# Patient Record
Sex: MALE | Race: WHITE
[De-identification: names, ages, dates, MRNs, and addresses within clinical notes are randomized per-mention and may not be internally consistent; named-entity substitution may affect disease eponyms.]

---

## 2017-11-08 ENCOUNTER — HOSPITAL ENCOUNTER (EMERGENCY)
Dept: HOSPITAL 47 - EC | Age: 27
Discharge: HOME | End: 2017-11-08
Payer: COMMERCIAL

## 2017-11-08 VITALS
SYSTOLIC BLOOD PRESSURE: 114 MMHG | RESPIRATION RATE: 18 BRPM | HEART RATE: 80 BPM | TEMPERATURE: 98.5 F | DIASTOLIC BLOOD PRESSURE: 75 MMHG

## 2017-11-08 DIAGNOSIS — H10.9: Primary | ICD-10-CM

## 2017-11-08 DIAGNOSIS — F17.200: ICD-10-CM

## 2017-11-08 DIAGNOSIS — Z88.8: ICD-10-CM

## 2017-11-08 PROCEDURE — 99283 EMERGENCY DEPT VISIT LOW MDM: CPT

## 2017-11-08 NOTE — ED
Eye Problem HPI





- General


Chief complaint: Eye Problems


Stated complaint: Poss Pink Eye


Time Seen by Provider: 11/08/17 12:30


Source: patient


Mode of arrival: ambulatory


Limitations: no limitations





- History of Present Illness


Initial comments: 


27-year-old male patient presented to the emergency department today for 

complaints of bilateral eye redness and drainage.  Patient states that last 

evening at the end of his work shift his eyes started becoming irritated and 

itchy.  He states that when he woke this morning there was a lot of green 

discharge in his eyes were stuck shut.  Patient states that the irritation has 

continued throughout the day today.  Patient states that his son was recently 

treated for pinkeye.  Patient states he has also had some minor nasal 

congestion and drainage.  He denies any fever or chills.  Denies any blurred or 

double vision. Patient denies any recent shortness breath, cough, chest pain, 

abdominal pain, nausea, vomiting, diarrhea, constipation, back pain, numbness, 

tingling, headache, visual changes, hematuria, dysuria, urinary frequency, 

urinary urgency, or any other complaints.








- Related Data


 Previous Rx's











 Medication  Instructions  Recorded


 


Polymyxin B-Trimethoprim Ophth 1 drops BOTH EYES Q4H #15 ml 11/08/17





[Polytrim Opthalmic]  











 Allergies











Allergy/AdvReac Type Severity Reaction Status Date / Time


 


clonazepam [From Klonopin] Allergy  Anaphylaxis Verified 11/08/17 12:28














Review of Systems


ROS Statement: 


Those systems with pertinent positive or pertinent negative responses have been 

documented in the HPI.





ROS Other: All systems not noted in ROS Statement are negative.





Past Medical History


Additional Past Medical History / Comment(s): heart murmur,


History of Any Multi-Drug Resistant Organisms: None Reported


Past Surgical History: No Surgical Hx Reported


Past Psychological History: Bipolar, Depression


Smoking Status: Current every day smoker


Past Alcohol Use History: Occasional


Past Drug Use History: None Reported





General Exam


Limitations: no limitations


General appearance: alert, in no apparent distress, other (This is a well-

developed, well-nourished adult male patient in no acute distress.  Vital signs 

upon presentation her temperature 98.5F, pulse 80, respirations 18, blood 

pressure 114/75, pulse ox 98% on room air.)


Eye exam: Present: PERRL, EOMI, conjunctival injection (Bilateral, mild), other 

(Presence of green crusting the bilateral inner canthus,  Mucus present on 

eyelashes.).  Absent: scleral icterus, nystagmus, periorbital swelling


ENT exam: Present: normal exam, normal oropharynx, mucous membranes moist, TM's 

normal bilaterally


Respiratory exam: Present: normal lung sounds bilaterally.  Absent: respiratory 

distress, wheezes, rales, rhonchi, stridor


Cardiovascular Exam: Present: regular rate, normal rhythm, normal heart sounds.

  Absent: systolic murmur, diastolic murmur, rubs, gallop, clicks


Neurological exam: Present: alert, oriented X3, CN II-XII intact


Psychiatric exam: Present: normal affect, normal mood


Skin exam: Present: warm, dry, intact, normal color.  Absent: rash





Course


 Vital Signs











  11/08/17





  12:27


 


Temperature 98.5 F


 


Pulse Rate 80


 


Respiratory 18





Rate 


 


Blood Pressure 114/75


 


O2 Sat by Pulse 98





Oximetry 














Medical Decision Making





- Medical Decision Making


Physical 7-year-old male patient who presented today for evaluation of 

bilateral eye redness and drainage.  Physical examination did reveal bilateral 

conjunctival injection, with green purulent discharge.  Patient's son was 

recently treated for pinkeye.  Errol patient on trimethroprim polymyxin B drops 

1 drop per eye 4 times daily.  Patient denied any visual disturbance.  Denied 

any eye pain.  He will be sent to ophthalmology if his symptoms do not improve 

over the next 1-2 days.  He is instructed to return here immediately for any new

, worsening, or concerning symptoms.  He verbalizes understanding and agreement 

with this plan.








Disposition


Clinical Impression: 


 Conjunctivitis





Disposition: HOME SELF-CARE


Condition: Good


Instructions:  Conjunctivitis (ED)


Additional Instructions: 


Use drops as directed. Follow up with ophthalmology in 2 days if symptoms are 

not improved. Return here immediately for any new, worsening, or concerning 

symptoms. 


Prescriptions: 


Polymyxin B-Trimethoprim Ophth [Polytrim Opthalmic] 1 drops BOTH EYES Q4H #15 ml


Referrals: 


None,Stated [Primary Care Provider] - 1-2 days


Time of Disposition: 12:38

## 2018-03-01 ENCOUNTER — HOSPITAL ENCOUNTER (EMERGENCY)
Dept: HOSPITAL 47 - EC | Age: 28
Discharge: HOME | End: 2018-03-01
Payer: COMMERCIAL

## 2018-03-01 VITALS
HEART RATE: 84 BPM | TEMPERATURE: 98.4 F | SYSTOLIC BLOOD PRESSURE: 104 MMHG | DIASTOLIC BLOOD PRESSURE: 55 MMHG | RESPIRATION RATE: 20 BRPM

## 2018-03-01 DIAGNOSIS — J02.9: ICD-10-CM

## 2018-03-01 DIAGNOSIS — M54.5: ICD-10-CM

## 2018-03-01 DIAGNOSIS — F17.200: ICD-10-CM

## 2018-03-01 DIAGNOSIS — Z88.8: ICD-10-CM

## 2018-03-01 DIAGNOSIS — R78.89: ICD-10-CM

## 2018-03-01 DIAGNOSIS — R00.0: ICD-10-CM

## 2018-03-01 DIAGNOSIS — D72.829: ICD-10-CM

## 2018-03-01 DIAGNOSIS — J40: Primary | ICD-10-CM

## 2018-03-01 LAB
ANION GAP SERPL CALC-SCNC: 12 MMOL/L
BASOPHILS # BLD AUTO: 0.1 K/UL (ref 0–0.2)
BASOPHILS NFR BLD AUTO: 0 %
BUN SERPL-SCNC: 9 MG/DL (ref 9–20)
CALCIUM SPEC-MCNC: 9.1 MG/DL (ref 8.4–10.2)
CHLORIDE SERPL-SCNC: 99 MMOL/L (ref 98–107)
CO2 SERPL-SCNC: 26 MMOL/L (ref 22–30)
EOSINOPHIL # BLD AUTO: 0.2 K/UL (ref 0–0.7)
EOSINOPHIL NFR BLD AUTO: 1 %
ERYTHROCYTE [DISTWIDTH] IN BLOOD BY AUTOMATED COUNT: 4.93 M/UL (ref 4.3–5.9)
ERYTHROCYTE [DISTWIDTH] IN BLOOD: 12.4 % (ref 11.5–15.5)
GLUCOSE SERPL-MCNC: 96 MG/DL (ref 74–99)
HCT VFR BLD AUTO: 42.4 % (ref 39–53)
HGB BLD-MCNC: 14.5 GM/DL (ref 13–17.5)
LYMPHOCYTES # SPEC AUTO: 1.1 K/UL (ref 1–4.8)
LYMPHOCYTES NFR SPEC AUTO: 7 %
MCH RBC QN AUTO: 29.5 PG (ref 25–35)
MCHC RBC AUTO-ENTMCNC: 34.3 G/DL (ref 31–37)
MCV RBC AUTO: 86 FL (ref 80–100)
MONOCYTES # BLD AUTO: 0.8 K/UL (ref 0–1)
MONOCYTES NFR BLD AUTO: 5 %
NEUTROPHILS # BLD AUTO: 14 K/UL (ref 1.3–7.7)
NEUTROPHILS NFR BLD AUTO: 86 %
PH UR: 6 [PH] (ref 5–8)
PLATELET # BLD AUTO: 144 K/UL (ref 150–450)
POTASSIUM SERPL-SCNC: 4.4 MMOL/L (ref 3.5–5.1)
SODIUM SERPL-SCNC: 137 MMOL/L (ref 137–145)
SP GR UR: 1.02 (ref 1–1.03)
UROBILINOGEN UR QL STRIP: 2 MG/DL (ref ?–2)
WBC # BLD AUTO: 16.3 K/UL (ref 3.8–10.6)

## 2018-03-01 PROCEDURE — 85025 COMPLETE CBC W/AUTO DIFF WBC: CPT

## 2018-03-01 PROCEDURE — 81003 URINALYSIS AUTO W/O SCOPE: CPT

## 2018-03-01 PROCEDURE — 71046 X-RAY EXAM CHEST 2 VIEWS: CPT

## 2018-03-01 PROCEDURE — 96360 HYDRATION IV INFUSION INIT: CPT

## 2018-03-01 PROCEDURE — 96361 HYDRATE IV INFUSION ADD-ON: CPT

## 2018-03-01 PROCEDURE — 72100 X-RAY EXAM L-S SPINE 2/3 VWS: CPT

## 2018-03-01 PROCEDURE — 80048 BASIC METABOLIC PNL TOTAL CA: CPT

## 2018-03-01 PROCEDURE — 36415 COLL VENOUS BLD VENIPUNCTURE: CPT

## 2018-03-01 PROCEDURE — 99283 EMERGENCY DEPT VISIT LOW MDM: CPT

## 2018-03-01 NOTE — XR
EXAMINATION TYPE: XR chest 2V

 

DATE OF EXAM: 3/1/2018

 

COMPARISON: Prior chest x-ray August 14, 2015.

 

HISTORY: Productive cough with sore throat and sinus congestion.

 

TECHNIQUE:  Frontal and lateral views of the chest are obtained.

 

FINDINGS:  There is no focal air space opacity, pleural effusion, or pneumothorax seen.  The cardiac 
silhouette size is within normal limits.   The osseous structures are intact.

 

IMPRESSION:  No acute pulmonary process. No significant change from prior.

## 2018-03-01 NOTE — XR
EXAMINATION TYPE: XR lumbar spine 2 or 3V

 

DATE OF EXAM: 3/1/2018

 

CLINICAL HISTORY: Low back pain.

 

TECHNIQUE: Frontal and lateral images of the lumbar spine are obtained.

 

COMPARISON: None

 

FINDINGS:  There are 5 lumbar type vertebral bodies identified.  The lumbar spine shows straightened 
alignment without evidence of acute fracture or dislocation. Spina bifida defect S1 level is incident
ally noted. Vertebral body heights and disk space heights are within normal limits. No significant sp
urring is present.  The overlying soft tissue appears unremarkable.

 

IMPRESSION: Straightening of lumbar spine with spina bifida defect S1 level.

## 2018-04-11 ENCOUNTER — HOSPITAL ENCOUNTER (EMERGENCY)
Dept: HOSPITAL 47 - EC | Age: 28
Discharge: HOME | End: 2018-04-11
Payer: SELF-PAY

## 2018-04-11 VITALS — TEMPERATURE: 98 F | SYSTOLIC BLOOD PRESSURE: 116 MMHG | DIASTOLIC BLOOD PRESSURE: 58 MMHG | HEART RATE: 74 BPM

## 2018-04-11 VITALS — RESPIRATION RATE: 18 BRPM

## 2018-04-11 DIAGNOSIS — F17.200: ICD-10-CM

## 2018-04-11 DIAGNOSIS — K08.89: ICD-10-CM

## 2018-04-11 DIAGNOSIS — R06.02: ICD-10-CM

## 2018-04-11 DIAGNOSIS — Z88.8: ICD-10-CM

## 2018-04-11 DIAGNOSIS — R07.81: ICD-10-CM

## 2018-04-11 DIAGNOSIS — G89.18: Primary | ICD-10-CM

## 2018-04-11 LAB
ALBUMIN SERPL-MCNC: 4.1 G/DL (ref 3.5–5)
ALP SERPL-CCNC: 53 U/L (ref 38–126)
ALT SERPL-CCNC: 23 U/L (ref 21–72)
ANION GAP SERPL CALC-SCNC: 12 MMOL/L
APTT BLD: 25.9 SEC (ref 22–30)
AST SERPL-CCNC: 17 U/L (ref 17–59)
BASOPHILS # BLD AUTO: 0 K/UL (ref 0–0.2)
BASOPHILS NFR BLD AUTO: 1 %
BUN SERPL-SCNC: 7 MG/DL (ref 9–20)
CALCIUM SPEC-MCNC: 9.4 MG/DL (ref 8.4–10.2)
CHLORIDE SERPL-SCNC: 103 MMOL/L (ref 98–107)
CO2 SERPL-SCNC: 28 MMOL/L (ref 22–30)
EOSINOPHIL # BLD AUTO: 0.1 K/UL (ref 0–0.7)
EOSINOPHIL NFR BLD AUTO: 2 %
ERYTHROCYTE [DISTWIDTH] IN BLOOD BY AUTOMATED COUNT: 5 M/UL (ref 4.3–5.9)
ERYTHROCYTE [DISTWIDTH] IN BLOOD: 12.7 % (ref 11.5–15.5)
GLUCOSE SERPL-MCNC: 59 MG/DL (ref 74–99)
HCT VFR BLD AUTO: 43.6 % (ref 39–53)
HGB BLD-MCNC: 15.4 GM/DL (ref 13–17.5)
INR PPP: 1.1 (ref ?–1.2)
LYMPHOCYTES # SPEC AUTO: 1.6 K/UL (ref 1–4.8)
LYMPHOCYTES NFR SPEC AUTO: 29 %
MCH RBC QN AUTO: 30.8 PG (ref 25–35)
MCHC RBC AUTO-ENTMCNC: 35.3 G/DL (ref 31–37)
MCV RBC AUTO: 87.2 FL (ref 80–100)
MONOCYTES # BLD AUTO: 0.4 K/UL (ref 0–1)
MONOCYTES NFR BLD AUTO: 8 %
NEUTROPHILS # BLD AUTO: 3.4 K/UL (ref 1.3–7.7)
NEUTROPHILS NFR BLD AUTO: 59 %
PLATELET # BLD AUTO: 177 K/UL (ref 150–450)
POTASSIUM SERPL-SCNC: 4.2 MMOL/L (ref 3.5–5.1)
PROT SERPL-MCNC: 6.7 G/DL (ref 6.3–8.2)
PT BLD: 10.3 SEC (ref 9–12)
SODIUM SERPL-SCNC: 143 MMOL/L (ref 137–145)
WBC # BLD AUTO: 5.6 K/UL (ref 3.8–10.6)

## 2018-04-11 PROCEDURE — 85025 COMPLETE CBC W/AUTO DIFF WBC: CPT

## 2018-04-11 PROCEDURE — 99285 EMERGENCY DEPT VISIT HI MDM: CPT

## 2018-04-11 PROCEDURE — 93005 ELECTROCARDIOGRAM TRACING: CPT

## 2018-04-11 PROCEDURE — 84484 ASSAY OF TROPONIN QUANT: CPT

## 2018-04-11 PROCEDURE — 36415 COLL VENOUS BLD VENIPUNCTURE: CPT

## 2018-04-11 PROCEDURE — 85730 THROMBOPLASTIN TIME PARTIAL: CPT

## 2018-04-11 PROCEDURE — 71046 X-RAY EXAM CHEST 2 VIEWS: CPT

## 2018-04-11 PROCEDURE — 85610 PROTHROMBIN TIME: CPT

## 2018-04-11 PROCEDURE — 80053 COMPREHEN METABOLIC PANEL: CPT

## 2018-04-11 NOTE — XR
EXAMINATION TYPE: XR chest 2V

 

DATE OF EXAM: 4/11/2018

 

COMPARISON: 3/1/2018

 

HISTORY: Difficulty breathing

 

TECHNIQUE:  Frontal and lateral views of the chest are obtained.

 

FINDINGS:  Heart and mediastinum are normal. Lungs are clear. Diaphragm is normal. There is no sign o
f pleural effusion or pneumothorax. Bony thorax is intact.

 

IMPRESSION:  Normal chest. No change.

## 2018-04-11 NOTE — ED
General Adult HPI





- General


Chief complaint: Shortness of Breath


Stated complaint: Dental


Time Seen by Provider: 04/11/18 17:22


Source: patient, RN notes reviewed


Mode of arrival: ambulatory


Limitations: no limitations





- History of Present Illness


Initial comments: 


This is a 27-year-old male who presents to the emergency department with chief 

complaint of dental pain and shortness of breath.  Patient states that he had 4 

left lower teeth pulled yesterday at the dentist.  He states he was prescribed 

amoxicillin but has not yet picked up his prescription.  He complains of pain 

where teeth were pulled.  He states he was not given any pain medication.  

Patient also complains of shortness of breath.  He states that approximately 10 

times today he feels like he has to "try to breathe."  He states that he has to 

take deep breaths to try to catch his breath and feels pain in his rib cage 

while doing so.  Patient denies any cough or upper respiratory symptoms such as 

sore throat or rhinorrhea.  He does state that he has had previous episodes of 

this shortness of breath in the past.  He states he believes it is related to 

stress and admits to having an increase in stress in his life recently.  He 

denies any fevers or chills.  Denies abdominal pain, nausea or vomiting, 

diarrhea or constipation.  Denies chest pain.  Denies any recent 

hospitalizations or surgeries, recent surgeries, history of blood clots.








- Related Data


 Home Medications











 Medication  Instructions  Recorded  Confirmed


 


Ibuprofen [Motrin] 800 mg PO BID PRN 03/01/18 04/11/18








 Previous Rx's











 Medication  Instructions  Recorded


 


Ibuprofen 600 mg PO Q6HR #20 tablet 04/11/18











 Allergies











Allergy/AdvReac Type Severity Reaction Status Date / Time


 


clonazepam [From Klonopin] Allergy  Anaphylaxis Verified 04/11/18 17:34














Review of Systems


ROS Statement: 


Those systems with pertinent positive or pertinent negative responses have been 

documented in the HPI.





ROS Other: All systems not noted in ROS Statement are negative.





Past Medical History


Additional Past Medical History / Comment(s): heart murmur,


History of Any Multi-Drug Resistant Organisms: None Reported


Past Surgical History: No Surgical Hx Reported


Past Psychological History: Bipolar, Depression


Smoking Status: Current every day smoker


Past Alcohol Use History: Occasional


Past Drug Use History: None Reported





General Exam





- General Exam Comments


Initial Comments: 





General: Awake and alert, well-developed; in no apparent distress.


HEENT: Head atraumatic, normocephalic. Pupils are equal, round and reactive to 

light. Extraocular movements intact. Oropharynx moist without erythema or 

exudate.  Very poor dentition throughout with multiple missing teeth and dental 

caries.  Teeth numbers 17 through 20 are recently extracted.  No masses or 

areas of fluctuance noted.  Mild tenderness on palpation of gumline.


Neck: Supple. Normal ROM. 


Cardiovascular: Regular rate and rhythm. No murmurs, rubs or gallops. Chest 

symmetrical.  


Respiratory: Lungs clear to auscultation bilaterally. No wheezes, rales or 

rhonchi. Normal respiratory effort with no use of accessory muscles. 


Musculoskeletal: Normal ROM, no tenderness bilateral upper and lower 

extremities. Ambulating normally. 


Skin: Pink, warm and dry without rashes or lesions. 


Neurological: Alert and oriented x3. CN II-XII grossly intact. Speech is fluent 

and answers are appropriate. No focal neuro deficits. 


Psychiatric: Normal mood and affect. No overt signs of depression or anxiety 

noted. 











Limitations: no limitations





Course


 Vital Signs











  04/11/18





  16:43


 


Temperature 97.8 F


 


Pulse Rate 101 H


 


Respiratory 18





Rate 


 


Blood Pressure 119/80


 


O2 Sat by Pulse 98





Oximetry 














Medical Decision Making





- Medical Decision Making


This is a 27-year-old male presented to the emergency department with chief 

complaint of dental pain and shortness of breath.  Patient had 4 left lower 

molars removed yesterday by his dentist.  He states he was not given any pain 

medication.  Patient will be prescribed ibuprofen 600.  Patient also complained 

of episodes of shortness of breath through the day today.  He states this may 

be related to stress as he has had these episodes in the past during times of 

increased stress in his life.  EKG revealed normal sinus rhythm.  Chest x-ray 

revealed no acute abnormalities.  CBC, CMP, and coags were unremarkable.  

Troponin was negative.  Patient's vital signs have been stable and he is in no 

acute distress. PERC is 0. EKG revealed heart rate of 65 bpm, no recent surgery 

with general anesthesia, no recent hospitalizations or travel, no history of 

blood clots. Low suspicion for PE.  He will be discharged home at this time.  

Patient is in agreement with plan and voices understanding.  All questions were 

answered.








- Lab Data


Result diagrams: 


 04/11/18 17:41





 04/11/18 17:41


 Lab Results











  04/11/18 04/11/18 Range/Units





  17:41 17:41 


 


WBC  5.6   (3.8-10.6)  k/uL


 


RBC  5.00   (4.30-5.90)  m/uL


 


Hgb  15.4   (13.0-17.5)  gm/dL


 


Hct  43.6   (39.0-53.0)  %


 


MCV  87.2   (80.0-100.0)  fL


 


MCH  30.8   (25.0-35.0)  pg


 


MCHC  35.3   (31.0-37.0)  g/dL


 


RDW  12.7   (11.5-15.5)  %


 


Plt Count  177   (150-450)  k/uL


 


Neutrophils %  59   %


 


Lymphocytes %  29   %


 


Monocytes %  8   %


 


Eosinophils %  2   %


 


Basophils %  1   %


 


Neutrophils #  3.4   (1.3-7.7)  k/uL


 


Lymphocytes #  1.6   (1.0-4.8)  k/uL


 


Monocytes #  0.4   (0-1.0)  k/uL


 


Eosinophils #  0.1   (0-0.7)  k/uL


 


Basophils #  0.0   (0-0.2)  k/uL


 


Sodium   143  (137-145)  mmol/L


 


Potassium   4.2  (3.5-5.1)  mmol/L


 


Chloride   103  ()  mmol/L


 


Carbon Dioxide   28  (22-30)  mmol/L


 


Anion Gap   12  mmol/L


 


BUN   7 L  (9-20)  mg/dL


 


Creatinine   0.69  (0.66-1.25)  mg/dL


 


Est GFR (CKD-EPI)AfAm   >90  (>60 ml/min/1.73 sqM)  


 


Est GFR (CKD-EPI)NonAf   >90  (>60 ml/min/1.73 sqM)  


 


Glucose   59 L  (74-99)  mg/dL


 


Calcium   9.4  (8.4-10.2)  mg/dL


 


Total Bilirubin   0.5  (0.2-1.3)  mg/dL


 


AST   17  (17-59)  U/L


 


ALT   23  (21-72)  U/L


 


Alkaline Phosphatase   53  ()  U/L


 


Total Protein   6.7  (6.3-8.2)  g/dL


 


Albumin   4.1  (3.5-5.0)  g/dL














17:48:26.  Normal sinus rhythm.  Ventricular rate 65 bpm, VT interval 124, QRS 

duration 96, QT//84








- Radiology Data


Radiology results: report reviewed


Chest x-ray impression: Normal chest.  No change.





Disposition


Clinical Impression: 


 Pain, dental





Disposition: HOME SELF-CARE


Condition: Good


Instructions:  Toothache (ED)


Additional Instructions: 


Please take medications as prescribed. Please follow up with primary care 

provider within 1-2 days. Return to emergency department if symptoms should 

worsen or any concerns arise. 


Prescriptions: 


Ibuprofen 600 mg PO Q6HR #20 tablet


Referrals: 


None,Stated [Primary Care Provider] - 1-2 days


Time of Disposition: 19:07

## 2018-07-11 ENCOUNTER — HOSPITAL ENCOUNTER (EMERGENCY)
Dept: HOSPITAL 47 - EC | Age: 28
Discharge: HOME | End: 2018-07-11
Payer: COMMERCIAL

## 2018-07-11 VITALS
DIASTOLIC BLOOD PRESSURE: 71 MMHG | SYSTOLIC BLOOD PRESSURE: 131 MMHG | HEART RATE: 72 BPM | RESPIRATION RATE: 18 BRPM | TEMPERATURE: 98.7 F

## 2018-07-11 DIAGNOSIS — R94.02: ICD-10-CM

## 2018-07-11 DIAGNOSIS — S00.81XA: Primary | ICD-10-CM

## 2018-07-11 DIAGNOSIS — M54.2: ICD-10-CM

## 2018-07-11 DIAGNOSIS — F17.200: ICD-10-CM

## 2018-07-11 DIAGNOSIS — R40.2412: ICD-10-CM
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
## 2018-07-11 DIAGNOSIS — Z23: ICD-10-CM

## 2018-07-11 DIAGNOSIS — Z88.8: ICD-10-CM

## 2018-07-11 PROCEDURE — 72125 CT NECK SPINE W/O DYE: CPT

## 2018-07-11 PROCEDURE — 99283 EMERGENCY DEPT VISIT LOW MDM: CPT

## 2018-07-11 PROCEDURE — 90471 IMMUNIZATION ADMIN: CPT

## 2018-07-11 PROCEDURE — 70450 CT HEAD/BRAIN W/O DYE: CPT

## 2018-07-11 PROCEDURE — 90715 TDAP VACCINE 7 YRS/> IM: CPT

## 2018-07-11 NOTE — ED
General Adult HPI





- General


Chief complaint: Head Injury


Stated complaint: head injury


Time Seen by Provider: 07/11/18 19:48


Source: patient, RN notes reviewed


Mode of arrival: ambulatory


Limitations: no limitations





- History of Present Illness


Initial comments: 





28-year-old male presents to the emergency department for a chief complaint of 

head injury.  Patient states he dropped a television on his head yesterday when 

he was trying to lift into the dumpster.  Patient denies loss of consciousness 

at that time but states he has been dizzy since then.  Patient states he has 

had a headache since the incident yesterday.  Patient denies any visual 

changes.  Patient also admits to neck pain.  Patient denies any other 

injuries.Patient has no other complaints at this time including shortness of 

breath, chest pain, abdominal pain, nausea or vomiting, headache, or visual 

changes.





- Related Data


 Home Medications











 Medication  Instructions  Recorded  Confirmed


 


Ibuprofen [Motrin Ib] 1,000 mg PO Q6H PRN 07/11/18 07/11/18








 Previous Rx's











 Medication  Instructions  Recorded


 


Acetaminophen [Tylenol] 500 mg PO Q4-6H PRN #20 tab 07/11/18











 Allergies











Allergy/AdvReac Type Severity Reaction Status Date / Time


 


clonazepam [From Klonopin] Allergy  Anaphylaxis Verified 07/11/18 20:07














Review of Systems


ROS Statement: 


Those systems with pertinent positive or pertinent negative responses have been 

documented in the HPI.





ROS Other: All systems not noted in ROS Statement are negative.





Past Medical History


Additional Past Medical History / Comment(s): heart murmur,


History of Any Multi-Drug Resistant Organisms: None Reported


Past Surgical History: No Surgical Hx Reported


Past Psychological History: Bipolar, Depression


Smoking Status: Current every day smoker


Past Alcohol Use History: Occasional


Past Drug Use History: None Reported





General Exam


Limitations: no limitations


General appearance: alert, in no apparent distress


Head exam: Present: normocephalic.  Absent: atraumatic (Patient has a 2 cm x 2 

cm abrasion on the frontal bone.  No step off palpated.)


Eye exam: Present: normal appearance, PERRL, EOMI.  Absent: scleral icterus, 

conjunctival injection, nystagmus, periorbital swelling, periorbital tenderness


ENT exam: Present: normal exam, normal oropharynx, mucous membranes moist, TM's 

normal bilaterally, normal external ear exam


Neck exam: Present: tenderness (Mild tenderness to the cervical spine), full 

ROM.  Absent: meningismus


Respiratory exam: Present: normal lung sounds bilaterally.  Absent: respiratory 

distress, wheezes, rales, rhonchi, stridor


Cardiovascular Exam: Present: regular rate, normal rhythm, normal heart sounds.

  Absent: systolic murmur, diastolic murmur, rubs, gallop, clicks


Neurological exam: Present: alert, oriented X3, CN II-XII intact, normal gait, 

other (GCS 15, negative arm drift, strength 5 out of 5 in upper and lower 

extremities bilaterally).  Absent: motor sensory deficit





Course


 Vital Signs











  07/11/18





  19:24


 


Temperature 98.7 F


 


Pulse Rate 72


 


Respiratory 18





Rate 


 


Blood Pressure 131/71


 


O2 Sat by Pulse 99





Oximetry 














Medical Decision Making





- Medical Decision Making





28-year-old male presents to the emergency department for a chief complaint of 

head injury.  Patient dropped a television on his head last night.  Patient has 

been experiencing headaches and dizziness since that time.  Patient also admits 

to mild neck pain.  Mild tenderness on exam but full range of motion.  On exam 

no focal neuro deficits.  GCS 15. Computed tomography scan of the head shows no 

mass effect or midline shift.  No sign of intra-cranial hemorrhage.  Intact 

calvarium.  There is a possible new area of small vessel ischemi measuring as a 

4 mm area of hypodensity.  This does not fit the clinical picture.  Cervical 

spine shows no evidence of compression facture or displacement.  Patient will 

follow-up with primary care for this.  He will take Tylenol for pain.  He will 

monitor for worsening symptoms or return if these occur.





Disposition


Clinical Impression: 


 Closed head injury





Disposition: HOME SELF-CARE


Condition: Good


Instructions:  Head Injury (ED)


Additional Instructions: 


Please take Tylenol for pain.  Please monitor for worsening symptoms such as 

severe headache, confusion, vomiting and return if these occur.  Follow-up with 

primary care in 1-2 days.


Prescriptions: 


Acetaminophen [Tylenol] 500 mg PO Q4-6H PRN #20 tab


 PRN Reason: Pain


Is patient prescribed a controlled substance at d/c from ED?: No


Referrals: 


Christiano Matta MD [STAFF PHYSICIAN] - 1-2 days


Time of Disposition: 20:53

## 2018-07-11 NOTE — CT
EXAMINATION TYPE: CT brain luisito taveras con

 

DATE OF EXAM: 7/11/2018

 

COMPARISON: Head CT scan 7/14/2012

 

HISTORY: pain headache. Neck pain.

 

CT DLP: 1022.3 mGycm

Automated exposure control for dose reduction was used.

 

TECHNIQUE: CT scan of the head and cervical spine are performed without contrast.

 

FINDINGS:   Ventricles of normal size. There is no mass effect nor midline shift. There is no sign of
 intracranial hemorrhage. There is intact calvarium. There is 4 mm hypodensity in the left anterior i
nternal capsule.

 

 

 

Cervical vertebra have normal spacing and alignment. Posterior elements are intact. Facet joints appe
ar normal. There is no evidence of a compression fracture. The skull base appears intact.

 

IMPRESSION:

Possible new area of small vessel ischemia in the anterior left internal capsule compared to old exam
.. 

 

Negative CT scan of cervical spine.

## 2018-08-20 ENCOUNTER — HOSPITAL ENCOUNTER (EMERGENCY)
Dept: HOSPITAL 47 - EC | Age: 28
Discharge: HOME | End: 2018-08-20
Payer: COMMERCIAL

## 2018-08-20 VITALS
TEMPERATURE: 98.2 F | DIASTOLIC BLOOD PRESSURE: 67 MMHG | SYSTOLIC BLOOD PRESSURE: 113 MMHG | HEART RATE: 51 BPM | RESPIRATION RATE: 18 BRPM
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
## 2018-08-20 DIAGNOSIS — Z88.8: ICD-10-CM
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
## 2018-08-20 DIAGNOSIS — S53.402A: Primary | ICD-10-CM

## 2018-08-20 DIAGNOSIS — F17.200: ICD-10-CM

## 2018-08-20 PROCEDURE — 99283 EMERGENCY DEPT VISIT LOW MDM: CPT

## 2018-08-20 NOTE — ED
Upper Extremity HPI





- General


Chief Complaint: Extremity Injury, Upper


Stated Complaint: leg pain


Time Seen by Provider: 08/20/18 07:50


Source: patient, RN notes reviewed


Mode of arrival: ambulatory


Limitations: no limitations





- History of Present Illness


Initial Comments: 





28-year-old male presents to the emergency Department with the chief complaint 

of left elbow pain.  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 

with his left arm with a gun and felt a pop in his left elbow.  He states that 

he had to use of the hand to fully extend his hand and arm at that time he 

states that he continued working but every time he goes to do full extension of 

his elbow he has popping, severe pain.  Patient is right-hand dominant.  Denies 

any prior issues with his left elbow, arm.  Patient denies any paresthesias 

denies any direct trauma to his left elbow and arm.





- Related Data


 Home Medications











 Medication  Instructions  Recorded  Confirmed


 


Ibuprofen [Motrin Ib] 1,000 mg PO Q6H PRN 07/11/18 07/11/18








 Previous Rx's











 Medication  Instructions  Recorded


 


Acetaminophen [Tylenol] 500 mg PO Q4-6H PRN #20 tab 07/11/18


 


Ibuprofen [Motrin] 600 mg PO Q8HR PRN #30 tab 08/20/18











 Allergies











Allergy/AdvReac Type Severity Reaction Status Date / Time


 


clonazepam [From Klonopin] Allergy  Anaphylaxis Verified 07/11/18 20:07














Review of Systems


ROS Statement: 


Those systems with pertinent positive or pertinent negative responses have been 

documented in the HPI.





ROS Other: All systems not noted in ROS Statement are negative.





Past Medical History


Additional Past Medical History / Comment(s): heart murmur,


History of Any Multi-Drug Resistant Organisms: None Reported


Past Surgical History: No Surgical Hx Reported


Past Psychological History: Bipolar, Depression


Smoking Status: Current every day smoker


Past Alcohol Use History: Occasional


Past Drug Use History: None Reported





General Exam


Limitations: no limitations


General appearance: alert, in no apparent distress


Respiratory exam: Present: normal lung sounds bilaterally.  Absent: respiratory 

distress, wheezes, rales, rhonchi, stridor


Cardiovascular Exam: Present: regular rate, normal rhythm, normal heart sounds.

  Absent: systolic murmur, diastolic murmur, rubs, gallop, clicks


Extremities exam: Present: other (Left arm neurovascular intact there is no 

obvious deformity no ecchymosis no change in warmth, patient has pain with 

extension of his left elbow, wrist extension there is mild tenderness the 

medial aspect of the elbow there is no tenderness of proximal left arm, minimal 

discomfort with pronation supination)


Neurological exam: Present: reflexes normal.  Absent: motor sensory deficit


Skin exam: Present: warm, dry, intact, normal color.  Absent: rash





Course


 Vital Signs











  08/20/18





  07:27


 


Temperature 98.1 F


 


Pulse Rate 80


 


Respiratory 16





Rate 


 


Blood Pressure 119/79


 


O2 Sat by Pulse 99





Oximetry 














Medical Decision Making





- Medical Decision Making





28-year-old male present to the ED for left elbow injury.  This appears to the 

left elbow strain.  Patient will be started on anti-inflammatories, follow up 

with [facility] advised to apply ice 20 minutes at a time return for any worsening 

symptoms.





Disposition


Clinical Impression: 


 Sprain of left elbow





Disposition: HOME SELF-CARE


Condition: Stable


Instructions:  Elbow Sprain (ED)


Additional Instructions: 


Please return to the Emergency Department if symptoms worsen or any other 

concerns.


Prescriptions: 


Ibuprofen [Motrin] 600 mg PO Q8HR PRN #30 tab


 PRN Reason: Pain


Is patient prescribed a controlled substance at d/c from ED?: No


Referrals: 


None,Stated [Primary Care Provider] - 1-2 days


Time of Disposition: 08:43

## 2018-08-20 NOTE — XR
EXAMINATION TYPE: XR elbow complete LT

 

DATE OF EXAM: 8/20/2018

 

CLINICAL HISTORY: Injury with pain.

 

TECHNIQUE:  Frontal, lateral and oblique images of the left elbow are obtained.

 

COMPARISON: None

 

FINDINGS:  There is no acute fracture/dislocation evident in the left elbow.  No abnormal fat pad sig
ns are seen.  The overlying soft tissue appears unremarkable.

 

IMPRESSION:  There is no acute fracture or dislocation in the left elbow.

## 2018-08-23 ENCOUNTER — HOSPITAL ENCOUNTER (EMERGENCY)
Dept: HOSPITAL 47 - EC | Age: 28
Discharge: HOME | End: 2018-08-23
Payer: COMMERCIAL

## 2018-08-23 VITALS — SYSTOLIC BLOOD PRESSURE: 119 MMHG | TEMPERATURE: 98.3 F | DIASTOLIC BLOOD PRESSURE: 62 MMHG | HEART RATE: 62 BPM

## 2018-08-23 VITALS — RESPIRATION RATE: 16 BRPM

## 2018-08-23 DIAGNOSIS — Z79.899: ICD-10-CM

## 2018-08-23 DIAGNOSIS — Z88.8: ICD-10-CM

## 2018-08-23 DIAGNOSIS — F17.200: ICD-10-CM

## 2018-08-23 DIAGNOSIS — R21: Primary | ICD-10-CM

## 2018-08-23 PROCEDURE — 87205 SMEAR GRAM STAIN: CPT

## 2018-08-23 PROCEDURE — 87529 HSV DNA AMP PROBE: CPT

## 2018-08-23 PROCEDURE — 99283 EMERGENCY DEPT VISIT LOW MDM: CPT

## 2018-08-23 PROCEDURE — 87070 CULTURE OTHR SPECIMN AEROBIC: CPT

## 2018-08-23 NOTE — ED
Male Urogenital HPI





- General


Chief complaint: Urogenital


Stated complaint: male gu


Time Seen by Provider: 08/23/18 19:33


Source: patient


Mode of arrival: ambulatory


Limitations: no limitations





- History of Present Illness


Initial comments: 


28 years old male presents with some blisters on his genitals he is a first 

time he notices blisters that was about a month ago then they got resolved now 

he has BLISTERS again for 3 days he is concerned about sexually transmitted 

diseases.  He has been with the same partner for 3 years and he hasn't noticed 

any lesions on her genital.  He did notice some mom bumps on her hand.  Review 

of system is unremarkable otherwise








- Related Data


 Home Medications











 Medication  Instructions  Recorded  Confirmed


 


Ibuprofen [Motrin Ib] 1,000 mg PO Q6H PRN 07/11/18 08/23/18


 


Ascorbic Acid [Vitamin C] 500 mg PO DAILY 08/23/18 08/23/18








 Previous Rx's











 Medication  Instructions  Recorded


 


Cephalexin [Keflex] 500 mg PO TID #21 capsule 08/23/18











 Allergies











Allergy/AdvReac Type Severity Reaction Status Date / Time


 


clonazepam [From Klonopin] Allergy  Anaphylaxis Verified 08/23/18 19:53














Review of Systems


ROS Statement: 


Those systems with pertinent positive or pertinent negative responses have been 

documented in the HPI.





ROS Other: All systems not noted in ROS Statement are negative.





Past Medical History


Additional Past Medical History / Comment(s): heart murmur,


History of Any Multi-Drug Resistant Organisms: None Reported


Past Surgical History: No Surgical Hx Reported


Past Psychological History: Bipolar, Depression


Smoking Status: Current every day smoker


Past Alcohol Use History: Occasional


Past Drug Use History: None Reported





General Exam





- General Exam Comments


Initial Comments: 


General:  The patient is awake and alert, in no distress, and does not appear 

acutely ill. 


Skin:  Skin is warm and dry and no rashes or lesions are noted. 


Eye:  Pupils are equal, round and reactive to light, extra-ocular movements are 

intact; there is normal conjunctiva bilaterally.  


Ears, nose, mouth and throat:  There are moist mucous membranes and no oral 

lesions. 


Neck:  The neck is supple, there is no tenderness  or JVD.  


Cardiovascular:  There is a regular rate and rhythm. No murmur, rub or gallop 

is appreciated.


Respiratory: To auscultation bilateral, no wheezing no rhonchi no distress  

respiratory wise noticed


Gastrointestinal:  Soft, non-distended, non-tender abdomen without masses or 

organomegaly noted. There is no rebound or guarding present. Bowel sounds are 

unremarkable.  Examination of the gases reveal a group of vesicles on the 

proximal penile shaft, this patient could be a viral, or paresthesia noted be 

impetigo


Back:  There is no tenderness to palpation in the midline. There is no obvious 

deformity.


Musculoskeletal:  Normal ROM, no tenderness, There is no pedal edema. There is 

no calf tenderness or swelling. No cords were appreciated.  


Neurological:  CN II-XII intact, Cranial nerves III through XII are intact. 

There are no obvious motor or sensory deficits. Coordination appears grossly 

intact. Speech is normal.


Psychiatric:  Cooperative, appropriate mood & affect, normal judgment.  








Limitations: no limitations





Course


 Vital Signs











  08/23/18





  18:48


 


Temperature 99.0 F


 


Pulse Rate 106 H


 


Respiratory 16





Rate 


 


Blood Pressure 113/79


 


O2 Sat by Pulse 97





Oximetry 








Viral cultures were done to see if is a herpes and bacterial cultures were done 

as well looks are somewhat open overlapping the field there are 45 vesicles 

grouped Or this is a folliculitis/impetigo both viral and bacterial cultures 

were done patient was advised to use condoms during the tilt he gets the 

culture report back.  I could be just viral infection with a secondary 

bacterial infection I plan to put him on now Keflex 500 mg 3 times a day for 

next 5 days and will add the antiviral if herpes is confirmed





Disposition


Clinical Impression: 


 Rash of genital area





Disposition: HOME SELF-CARE


Condition: Good


Prescriptions: 


Cephalexin [Keflex] 500 mg PO TID #21 capsule


Is patient prescribed a controlled substance at d/c from ED?: No


Referrals: 


None,Stated [Primary Care Provider] - 1-2 days

## 2019-04-19 ENCOUNTER — HOSPITAL ENCOUNTER (EMERGENCY)
Dept: HOSPITAL 47 - EC | Age: 29
Discharge: HOME | End: 2019-04-19
Payer: COMMERCIAL

## 2019-04-19 VITALS
RESPIRATION RATE: 18 BRPM | HEART RATE: 85 BPM | TEMPERATURE: 98.4 F | SYSTOLIC BLOOD PRESSURE: 111 MMHG | DIASTOLIC BLOOD PRESSURE: 73 MMHG

## 2019-04-19 DIAGNOSIS — Z79.899: ICD-10-CM

## 2019-04-19 DIAGNOSIS — K02.9: Primary | ICD-10-CM

## 2019-04-19 DIAGNOSIS — Z88.8: ICD-10-CM

## 2019-04-19 DIAGNOSIS — F17.200: ICD-10-CM

## 2019-04-19 PROCEDURE — 99283 EMERGENCY DEPT VISIT LOW MDM: CPT

## 2019-04-19 NOTE — ED
ENT HPI





- General


Chief complaint: Dental/Oral


Stated complaint: Oral Pain


Time Seen by Provider: 04/19/19 16:37


Source: patient, RN notes reviewed, old records reviewed


Mode of arrival: ambulatory


Limitations: no limitations





- History of Present Illness


Initial comments: 





Patient is a 28-year-old male who presents emergency Department today with 

complaints of dental pain front upper tooth.  Patient states that he has poor 

dentition and multiple dental caries.  Patient states that he was trying to see 

a dental clinic.  Unable to.  He's been having chronic pain from his teeth for 

many years.  Patient denies any fevers or chills.  Denies trismus or call 

drainage from the tooth.





- Related Data


                                Home Medications











 Medication  Instructions  Recorded  Confirmed


 


Ibuprofen [Motrin Ib] 1,000 mg PO Q6H PRN 07/11/18 08/23/18


 


Ascorbic Acid [Vitamin C] 500 mg PO DAILY 08/23/18 08/23/18








                                  Previous Rx's











 Medication  Instructions  Recorded


 


Cephalexin [Keflex] 500 mg PO TID #21 capsule 08/23/18


 


Penicillin V Potassium [Pen Vee K] 500 mg PO QID #40 tablet 04/19/19











                                    Allergies











Allergy/AdvReac Type Severity Reaction Status Date / Time


 


clonazepam [From Klonopin] Allergy  Anaphylaxis Verified 08/23/18 19:53














Review of Systems


ROS Statement: 


Those systems with pertinent positive or pertinent negative responses have been 

documented in the HPI.





ROS Other: All systems not noted in ROS Statement are negative.





Past Medical History


Additional Past Medical History / Comment(s): heart murmur,


History of Any Multi-Drug Resistant Organisms: None Reported


Past Surgical History: No Surgical Hx Reported


Past Psychological History: Bipolar, Depression


Smoking Status: Current every day smoker


Past Alcohol Use History: Occasional


Past Drug Use History: None Reported





General Exam





- General Exam Comments


Initial Comments: 





Patient is a 28-year-old male.  Alert and oriented 3.  Patient appears in no 

significant distress.


Limitations: no limitations


General appearance: alert, in no apparent distress


Head exam: Present: atraumatic, normocephalic, normal inspection


Eye exam: Present: normal appearance, PERRL, EOMI.  Absent: scleral icterus, 

conjunctival injection, periorbital swelling


ENT exam: Present: normal exam, mucous membranes moist.  Absent: normal 

oropharynx (Patient has significant poor dentition.  Multiple dental caries and 

erosion of the teeth noted.)


Neck exam: Present: normal inspection.  Absent: tenderness, meningismus, 

lymphadenopathy


Respiratory exam: Present: normal lung sounds bilaterally.  Absent: respiratory 

distress, wheezes, rales, rhonchi, stridor


Cardiovascular Exam: Present: regular rate, normal rhythm, normal heart sounds. 

Absent: systolic murmur, diastolic murmur, rubs, gallop, clicks


GI/Abdominal exam: Present: soft, normal bowel sounds.  Absent: distended, 

tenderness, guarding, rebound, rigid


Extremities exam: Present: normal inspection, full ROM, normal capillary refill.

 Absent: tenderness, pedal edema, joint swelling, calf tenderness


Back exam: Present: normal inspection


Neurological exam: Present: alert, oriented X3, CN II-XII intact


Psychiatric exam: Present: normal affect, normal mood


Skin exam: Present: warm, dry, intact, normal color.  Absent: rash





Course


                                   Vital Signs











  04/19/19





  16:33


 


Temperature 98.4 F


 


Pulse Rate 85


 


Respiratory 18





Rate 


 


Blood Pressure 111/73


 


O2 Sat by Pulse 98





Oximetry 














Medical Decision Making





- Medical Decision Making





Patient's 28-year-old male presents return today with dental pain.  He states is

somewhat chronic and poor dentition Patient is multiple dental caries noted.  

His erosion of the front of her tooth.  Discussed that he needs to see a dentist

EC fold.  We'll start the Patient on Pen-Vee K at this time.  This is needs to 

take Motrin Tylenol for pain and do saltwater and Listerine rinses.  All 

questions answered return parameters were discussed. 





Disposition


Clinical Impression: 


 Dental caries, Pain, dental





Disposition: HOME SELF-CARE


Condition: Good


Instructions (If sedation given, give patient instructions):  Dental Caries (ED)


Additional Instructions: 


Follow-up with primary care doctor.  Return to emergency department if any 

alarming signs or symptoms occur.








[facility]





[facility] [place] [address]





[phone] (existing clients only)





For new clients: [phone]





1st consult: $50 (includes Xrays)





Usually 30% less then private dentist for visits after. 





[facility] [employer]





Have to pay $50 for Xrays anmd rest is covered.





[phone]


Prescriptions: 


Penicillin V Potassium [Pen Vee K] 500 mg PO QID #40 tablet


Is patient prescribed a controlled substance at d/c from ED?: No


Referrals: 


Jeromy Guthrie MD [Primary Care Provider] - 1-2 days


Time of Disposition: 17:00

## 2019-07-27 ENCOUNTER — HOSPITAL ENCOUNTER (EMERGENCY)
Dept: HOSPITAL 47 - EC | Age: 29
Discharge: HOME | End: 2019-07-27
Payer: COMMERCIAL

## 2019-07-27 VITALS
HEART RATE: 59 BPM | RESPIRATION RATE: 18 BRPM | TEMPERATURE: 97.8 F | DIASTOLIC BLOOD PRESSURE: 77 MMHG | SYSTOLIC BLOOD PRESSURE: 108 MMHG

## 2019-07-27 DIAGNOSIS — N50.812: ICD-10-CM

## 2019-07-27 DIAGNOSIS — K59.00: Primary | ICD-10-CM

## 2019-07-27 DIAGNOSIS — Z88.8: ICD-10-CM

## 2019-07-27 DIAGNOSIS — R10.32: ICD-10-CM

## 2019-07-27 DIAGNOSIS — F17.200: ICD-10-CM

## 2019-07-27 LAB
ALBUMIN SERPL-MCNC: 4.3 G/DL (ref 3.5–5)
ALP SERPL-CCNC: 48 U/L (ref 38–126)
ALT SERPL-CCNC: 13 U/L (ref 21–72)
ANION GAP SERPL CALC-SCNC: 6 MMOL/L
AST SERPL-CCNC: 15 U/L (ref 17–59)
BASOPHILS # BLD AUTO: 0 K/UL (ref 0–0.2)
BASOPHILS NFR BLD AUTO: 1 %
BUN SERPL-SCNC: 8 MG/DL (ref 9–20)
CALCIUM SPEC-MCNC: 9.3 MG/DL (ref 8.4–10.2)
CHLORIDE SERPL-SCNC: 105 MMOL/L (ref 98–107)
CO2 SERPL-SCNC: 30 MMOL/L (ref 22–30)
EOSINOPHIL # BLD AUTO: 0.2 K/UL (ref 0–0.7)
EOSINOPHIL NFR BLD AUTO: 3 %
ERYTHROCYTE [DISTWIDTH] IN BLOOD BY AUTOMATED COUNT: 4.67 M/UL (ref 4.3–5.9)
ERYTHROCYTE [DISTWIDTH] IN BLOOD: 14.6 % (ref 11.5–15.5)
GLUCOSE SERPL-MCNC: 74 MG/DL (ref 74–99)
HCT VFR BLD AUTO: 41.1 % (ref 39–53)
HGB BLD-MCNC: 13.8 GM/DL (ref 13–17.5)
LYMPHOCYTES # SPEC AUTO: 1.7 K/UL (ref 1–4.8)
LYMPHOCYTES NFR SPEC AUTO: 31 %
MCH RBC QN AUTO: 29.6 PG (ref 25–35)
MCHC RBC AUTO-ENTMCNC: 33.6 G/DL (ref 31–37)
MCV RBC AUTO: 88.2 FL (ref 80–100)
MONOCYTES # BLD AUTO: 0.4 K/UL (ref 0–1)
MONOCYTES NFR BLD AUTO: 7 %
NEUTROPHILS # BLD AUTO: 3.1 K/UL (ref 1.3–7.7)
NEUTROPHILS NFR BLD AUTO: 57 %
PH UR: 6 [PH] (ref 5–8)
PLATELET # BLD AUTO: 156 K/UL (ref 150–450)
POTASSIUM SERPL-SCNC: 4.1 MMOL/L (ref 3.5–5.1)
PROT SERPL-MCNC: 7 G/DL (ref 6.3–8.2)
SODIUM SERPL-SCNC: 141 MMOL/L (ref 137–145)
SP GR UR: 1.01 (ref 1–1.03)
UROBILINOGEN UR QL STRIP: <2 MG/DL (ref ?–2)
WBC # BLD AUTO: 5.4 K/UL (ref 3.8–10.6)

## 2019-07-27 PROCEDURE — 99284 EMERGENCY DEPT VISIT MOD MDM: CPT

## 2019-07-27 PROCEDURE — 83690 ASSAY OF LIPASE: CPT

## 2019-07-27 PROCEDURE — 36415 COLL VENOUS BLD VENIPUNCTURE: CPT

## 2019-07-27 PROCEDURE — 96361 HYDRATE IV INFUSION ADD-ON: CPT

## 2019-07-27 PROCEDURE — 85025 COMPLETE CBC W/AUTO DIFF WBC: CPT

## 2019-07-27 PROCEDURE — 96374 THER/PROPH/DIAG INJ IV PUSH: CPT

## 2019-07-27 PROCEDURE — 81003 URINALYSIS AUTO W/O SCOPE: CPT

## 2019-07-27 PROCEDURE — 74150 CT ABDOMEN W/O CONTRAST: CPT

## 2019-07-27 PROCEDURE — 80053 COMPREHEN METABOLIC PANEL: CPT

## 2019-07-27 NOTE — CT
EXAMINATION TYPE: CT renal stones wo con

 

DATE OF EXAM: 7/27/2019

 

COMPARISON: None

 

HISTORY: 29-year-old male Generalized abdominal pain.

 

TECHNIQUE: Contiguous axial scanning of the abdomen and pelvis without IV contrast. Coronal and sagit
arielle reconstructions performed.

 

CT DLP: 291.1 mGycm

Automated exposure control for dose reduction was used.

 

 

FINDINGS: 

Heart normal size without pericardial effusion. Lung bases clear without pleural effusion.

 

Noncontrast appearance of the liver, gallbladder, adrenal glands, spleen, and pancreas show no gross 
abnormality.

 

No nephrolithiasis or hydronephrosis is seen. Tiny 2 mm cortical density lateral lower pole left kidn
ey likely tiny hemorrhagic cyst.

 

No dilated small bowel, free fluid, or free air. Limited intra-abdominal fat and lack of contrast duke
its assessment for abdominal or pelvic lymphadenopathy.

 

Normal appendix. Moderate stool within the low hanging cecum. No pericolonic inflammatory change jeffrey
rly identified.

 

Bladder is urine distended with mild circumferential wall thickening. Mild pelvic free fluid is noted
 of questionable etiology. An abnormal appendix.

 

Bones: No osseous destructive process.

 

 

IMPRESSION: 

 

1. MILD CIRCUMFERENTIAL BLADDER WALL THICKENING COULD REPRESENT CHRONIC BLADDER WALL HYPERTROPHY OR C
YSTITIS. CLINICALLY CORRELATE.

2. MILD PELVIC FREE FLUID OR ABNORMAL IN A MALE BUT OF UNCLEAR ETIOLOGY.

3. NO NEPHROLITHIASIS OR HYDRONEPHROSIS IS IDENTIFIED.

## 2019-07-27 NOTE — ED
Abdominal Pain HPI





- General


Chief Complaint: Abdominal Pain


Stated Complaint: Abd.pain


Time Seen by Provider: 07/27/19 13:14


Source: patient


Mode of arrival: ambulatory


Limitations: no limitations





- History of Present Illness


Initial Comments: 


Patient is a 29-year-old male who presents with a chief complaint of sharp and 

aching abdominal pain on the left side.  He says at this started yesterday.  He 

felt that he was possibly constipated, took 2 stool softeners, and several bowel

movements.  He says that initially it felt better after that however today when 

he woke up the pain was worse.  He denies any fevers or chills, nausea or 

vomiting.  He states he does have some left testicular pain.  He denies any 

dysuria, or penile discharge.








- Related Data


                                Home Medications











 Medication  Instructions  Recorded  Confirmed


 


Ibuprofen [Motrin Ib] 400 mg PO Q6H PRN 07/11/18 07/27/19








                                  Previous Rx's











 Medication  Instructions  Recorded


 


Polyethylene Glycol 3350 [Miralax] 17 gm PO BID #527 gm 07/27/19











                                    Allergies











Allergy/AdvReac Type Severity Reaction Status Date / Time


 


clonazepam [From Klonopin] Allergy  Anaphylaxis Verified 07/27/19 13:56














Review of Systems


ROS Statement: 


Those systems with pertinent positive or pertinent negative responses have been 

documented in the HPI.





ROS Other: All systems not noted in ROS Statement are negative.


Gastrointestinal: Reports: abdominal pain





Past Medical History


Additional Past Medical History / Comment(s): heart murmur,


History of Any Multi-Drug Resistant Organisms: None Reported


Past Surgical History: No Surgical Hx Reported


Additional Past Surgical History / Comment(s): Nasal surgery


Past Psychological History: Bipolar, Depression


Smoking Status: Current every day smoker


Past Alcohol Use History: Occasional


Past Drug Use History: None Reported





General Exam


Limitations: no limitations


General appearance: alert, in no apparent distress


Head exam: Present: atraumatic, normocephalic


Eye exam: Present: normal appearance


ENT exam: Present: normal exam


Neck exam: Present: normal inspection


Respiratory exam: Present: normal lung sounds bilaterally.  Absent: respiratory 

distress, wheezes


Cardiovascular Exam: Present: regular rate, normal rhythm


GI/Abdominal exam: Present: soft, tenderness (Patient has a tenderness in the 

left lower quadrant).  Absent: distended


Rectal exam: Present: deferred (.)


 exam: Present: normal inspection, testicular tenderness (Left-sided 

testicular tenderness), circumcision, other (Cremasteric reflex intact 

bilaterally)


Extremities exam: Present: normal inspection


Back exam: Present: normal inspection.  Absent: CVA tenderness (R), CVA 

tenderness (L)


Neurological exam: Present: alert, oriented X3


Psychiatric exam: Present: normal affect, normal mood


Skin exam: Present: warm, dry, intact





Course


                                   Vital Signs











  07/27/19 07/27/19





  13:09 15:11


 


Temperature 98.1 F 


 


Pulse Rate 94 


 


Respiratory 18 16





Rate  


 


Blood Pressure 104/70 


 


O2 Sat by Pulse 96 





Oximetry  














Medical Decision Making





- Medical Decision Making





Patient presents with chief complaint of left-sided abdominal pain.  On initial 

evaluation, vitals are stable, patient is no acute distress.  Patient will be 

evaluated basically including liver profile and lipase, he'll be sent for 

computed tomography scan of the abdomen and pelvis.  Urinalysis added.  Patient 

given Toradol and IV fluids.





6:04 PM


E evaluation this patient is unremarkable, urinalysis does not show evidence of 

infection.  Computed tomography scan does show a moderate amount of stool within

the colon.  Patient was stable on reevaluation.  At this time he is stable for 

discharge, patient was instructed to use MiraLAX twice daily for 1 week.  

Follow-up with primary care in 1-2 days, return to the ED symptoms worsen or 

change.





- Lab Data


Result diagrams: 


                                 07/27/19 14:29





                                 07/27/19 14:29


                                   Lab Results











  07/27/19 07/27/19 07/27/19 Range/Units





  14:29 14:29 16:35 


 


WBC  5.4    (3.8-10.6)  k/uL


 


RBC  4.67    (4.30-5.90)  m/uL


 


Hgb  13.8    (13.0-17.5)  gm/dL


 


Hct  41.1    (39.0-53.0)  %


 


MCV  88.2    (80.0-100.0)  fL


 


MCH  29.6    (25.0-35.0)  pg


 


MCHC  33.6    (31.0-37.0)  g/dL


 


RDW  14.6    (11.5-15.5)  %


 


Plt Count  156    (150-450)  k/uL


 


Neutrophils %  57    %


 


Lymphocytes %  31    %


 


Monocytes %  7    %


 


Eosinophils %  3    %


 


Basophils %  1    %


 


Neutrophils #  3.1    (1.3-7.7)  k/uL


 


Lymphocytes #  1.7    (1.0-4.8)  k/uL


 


Monocytes #  0.4    (0-1.0)  k/uL


 


Eosinophils #  0.2    (0-0.7)  k/uL


 


Basophils #  0.0    (0-0.2)  k/uL


 


Sodium   141   (137-145)  mmol/L


 


Potassium   4.1   (3.5-5.1)  mmol/L


 


Chloride   105   ()  mmol/L


 


Carbon Dioxide   30   (22-30)  mmol/L


 


Anion Gap   6   mmol/L


 


BUN   8 L   (9-20)  mg/dL


 


Creatinine   0.83   (0.66-1.25)  mg/dL


 


Est GFR (CKD-EPI)AfAm   >90   (>60 ml/min/1.73 sqM)  


 


Est GFR (CKD-EPI)NonAf   >90   (>60 ml/min/1.73 sqM)  


 


Glucose   74   (74-99)  mg/dL


 


Calcium   9.3   (8.4-10.2)  mg/dL


 


Total Bilirubin   0.8   (0.2-1.3)  mg/dL


 


AST   15 L   (17-59)  U/L


 


ALT   13 L   (21-72)  U/L


 


Alkaline Phosphatase   48   ()  U/L


 


Total Protein   7.0   (6.3-8.2)  g/dL


 


Albumin   4.3   (3.5-5.0)  g/dL


 


Lipase   41   ()  U/L


 


Urine Color    Yellow  


 


Urine Appearance    Clear  (Clear)  


 


Urine pH    6.0  (5.0-8.0)  


 


Ur Specific Gravity    1.012  (1.001-1.035)  


 


Urine Protein    Negative  (Negative)  


 


Urine Glucose (UA)    Negative  (Negative)  


 


Urine Ketones    Negative  (Negative)  


 


Urine Blood    Negative  (Negative)  


 


Urine Nitrite    Negative  (Negative)  


 


Urine Bilirubin    Negative  (Negative)  


 


Urine Urobilinogen    <2.0  (<2.0)  mg/dL


 


Ur Leukocyte Esterase    Negative  (Negative)  














Disposition


Clinical Impression: 


 Constipation, Abdominal pain





Disposition: HOME SELF-CARE


Condition: Good


Instructions (If sedation given, give patient instructions):  Abdominal Pain 

(ED)


Prescriptions: 


Polyethylene Glycol 3350 [Miralax] 17 gm PO BID #527 gm


Is patient prescribed a controlled substance at d/c from ED?: No


Referrals: 


Jeromy Guthrie MD [Primary Care Provider] - 1-2 days

## 2019-08-12 ENCOUNTER — HOSPITAL ENCOUNTER (EMERGENCY)
Dept: HOSPITAL 47 - EC | Age: 29
Discharge: HOME | End: 2019-08-12
Payer: COMMERCIAL

## 2019-08-12 VITALS
TEMPERATURE: 98.5 F | HEART RATE: 90 BPM | RESPIRATION RATE: 18 BRPM | SYSTOLIC BLOOD PRESSURE: 107 MMHG | DIASTOLIC BLOOD PRESSURE: 71 MMHG

## 2019-08-12 DIAGNOSIS — Z86.19: ICD-10-CM

## 2019-08-12 DIAGNOSIS — F17.200: ICD-10-CM

## 2019-08-12 DIAGNOSIS — N48.9: Primary | ICD-10-CM

## 2019-08-12 DIAGNOSIS — Z88.8: ICD-10-CM

## 2019-08-12 PROCEDURE — 99283 EMERGENCY DEPT VISIT LOW MDM: CPT

## 2019-08-12 NOTE — ED
General Adult HPI





- General


Chief complaint: Skin/Abscess/Foreign Body


Stated complaint: Male 


Time Seen by Provider: 08/12/19 16:58


Source: patient, RN notes reviewed


Mode of arrival: ambulatory


Limitations: no limitations





- History of Present Illness


Initial comments: 





29-year-old male with a past medical history of heart murmur, genital herpes 

presents to the emergency department for a chief complaint of penile pain.  

Patient states he believes he has an outbreak of herpes on the underside of his 

penis.  States it has been there for a few days now.  States is larger than he 

has had in the past.  States he was seen at [facility] yesterday 

and was given acyclovir but he has not yet had this filled.  Patient states he 

thought they should do some testing so came for a second opinion denies fevers. 

Denies penile discharge.. Patient has no other complaints at this time including

shortness of breath, chest pain, abdominal pain, nausea or vomiting, headache, 

or visual changes.





- Related Data


                                Home Medications











 Medication  Instructions  Recorded  Confirmed


 


Ibuprofen [Motrin Ib] 400 mg PO Q6H PRN 07/11/18 07/27/19








                                  Previous Rx's











 Medication  Instructions  Recorded


 


Polyethylene Glycol 3350 [Miralax] 17 gm PO BID #527 gm 07/27/19











                                    Allergies











Allergy/AdvReac Type Severity Reaction Status Date / Time


 


clonazepam [From Klonopin] Allergy  Anaphylaxis Verified 08/12/19 16:26














Review of Systems


ROS Statement: 


Those systems with pertinent positive or pertinent negative responses have been 

documented in the HPI.





ROS Other: All systems not noted in ROS Statement are negative.





Past Medical History


Additional Past Medical History / Comment(s): heart murmur,


History of Any Multi-Drug Resistant Organisms: None Reported


Past Surgical History: No Surgical Hx Reported


Additional Past Surgical History / Comment(s): Nasal surgery


Past Psychological History: Bipolar, Depression


Smoking Status: Current every day smoker


Past Alcohol Use History: Occasional


Past Drug Use History: None Reported





General Exam


Limitations: no limitations


General appearance: alert, in no apparent distress


Head exam: Present: atraumatic, normocephalic, normal inspection


Eye exam: Present: normal appearance, PERRL, EOMI.  Absent: scleral icterus, 

conjunctival injection, periorbital swelling


ENT exam: Present: normal exam, mucous membranes moist


Neck exam: Present: normal inspection, full ROM.  Absent: tenderness, 

meningismus, lymphadenopathy


Respiratory exam: Present: normal lung sounds bilaterally.  Absent: respiratory 

distress, wheezes, rales, rhonchi, stridor


Cardiovascular Exam: Present: regular rate, normal rhythm, normal heart sounds. 

Absent: systolic murmur, diastolic murmur, rubs, gallop, clicks


GI/Abdominal exam: Present: soft, normal bowel sounds.  Absent: distended, 

tenderness, guarding, rebound, rigid


 exam: Present: other (Patient has a 5 mm x 5 mm lesion to the underside of 

the penis.  It is crusting and vesicular in nature consistent with herpes 

simplex.).  Absent: normal inspection, testicular tenderness, urethral 

discharge, scrotal swelling, vertical testicular lie, circumcision


Neurological exam: Present: alert


Psychiatric exam: Present: normal affect, normal mood





Course





                                   Vital Signs











  08/12/19





  16:21


 


Temperature 98.5 F


 


Pulse Rate 90


 


Respiratory 18





Rate 


 


Blood Pressure 107/71


 


O2 Sat by Pulse 98





Oximetry 














Medical Decision Making





- Medical Decision Making





29-year-old male presents for pain in the underside of the penis times a few 

days.  Patient has a history of herpes on the genitals.  Patient states this 

lesion is there more painful than he normally has.  Patient fevers or chills.  

Denies penile discharge.  Denies any other concerns or abdominal pain.  Patient 

does not want other STD testing.  Soledad WADE present for exam.  On exam patient 

has a small 5 mm x 5 mm vesicular crusting lesion noted to the underside of the 

penis consistent with genital herpes.  No erythema or evidence of cellulitis.  

No purulent drainage.  Patient does have a perception for acyclovir up at the 

pharmacy right now.  He will take this.  He was given Motrin and Tylenol for 

pain.  He'll return if he has any worsening symptoms.  Recommend he follow up 

with dermatology in 1-2 days.





Disposition


Clinical Impression: 


 Penile lesion, History of herpes genitalis





Disposition: HOME SELF-CARE


Condition: Good


Instructions (If sedation given, give patient instructions):  Genital Herpes 

Simplex (ED)


Additional Instructions: 


Please take your acyclovir prescribed by [facility] as directed. 

Please follow-up with primary care in 1-2 days.  Return to the emergency 

department if you have any worsening symptoms.


Is patient prescribed a controlled substance at d/c from ED?: No


Referrals: 


Jeromy Guthrie MD [Primary Care Provider] - 1-2 days


Time of Disposition: 17:53

## 2020-06-09 ENCOUNTER — HOSPITAL ENCOUNTER (EMERGENCY)
Dept: HOSPITAL 47 - EC | Age: 30
LOS: 1 days | Discharge: HOME | End: 2020-06-10
Payer: COMMERCIAL

## 2020-06-09 VITALS — TEMPERATURE: 98 F

## 2020-06-09 DIAGNOSIS — Z88.8: ICD-10-CM

## 2020-06-09 DIAGNOSIS — R07.89: ICD-10-CM

## 2020-06-09 DIAGNOSIS — F31.9: ICD-10-CM

## 2020-06-09 DIAGNOSIS — Z79.899: ICD-10-CM

## 2020-06-09 DIAGNOSIS — F17.200: ICD-10-CM

## 2020-06-09 DIAGNOSIS — S83.92XA: Primary | ICD-10-CM
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
## 2020-06-09 LAB
ALBUMIN SERPL-MCNC: 3.9 G/DL (ref 3.5–5)
ALP SERPL-CCNC: 53 U/L (ref 38–126)
ALT SERPL-CCNC: 11 U/L (ref 4–49)
ANION GAP SERPL CALC-SCNC: 6 MMOL/L
APTT BLD: 28.3 SEC (ref 22–30)
AST SERPL-CCNC: 19 U/L (ref 17–59)
BASOPHILS # BLD AUTO: 0.1 K/UL (ref 0–0.2)
BASOPHILS NFR BLD AUTO: 1 %
BUN SERPL-SCNC: 6 MG/DL (ref 9–20)
CALCIUM SPEC-MCNC: 8.9 MG/DL (ref 8.4–10.2)
CHLORIDE SERPL-SCNC: 106 MMOL/L (ref 98–107)
CO2 SERPL-SCNC: 27 MMOL/L (ref 22–30)
EOSINOPHIL # BLD AUTO: 0.3 K/UL (ref 0–0.7)
EOSINOPHIL NFR BLD AUTO: 4 %
ERYTHROCYTE [DISTWIDTH] IN BLOOD BY AUTOMATED COUNT: 4.22 M/UL (ref 4.3–5.9)
ERYTHROCYTE [DISTWIDTH] IN BLOOD: 12.8 % (ref 11.5–15.5)
GLUCOSE SERPL-MCNC: 98 MG/DL (ref 74–99)
HCT VFR BLD AUTO: 37.2 % (ref 39–53)
HGB BLD-MCNC: 13.1 GM/DL (ref 13–17.5)
INR PPP: 1 (ref ?–1.2)
LYMPHOCYTES # SPEC AUTO: 2 K/UL (ref 1–4.8)
LYMPHOCYTES NFR SPEC AUTO: 32 %
MAGNESIUM SPEC-SCNC: 2 MG/DL (ref 1.6–2.3)
MCH RBC QN AUTO: 31 PG (ref 25–35)
MCHC RBC AUTO-ENTMCNC: 35.1 G/DL (ref 31–37)
MCV RBC AUTO: 88.2 FL (ref 80–100)
MONOCYTES # BLD AUTO: 0.4 K/UL (ref 0–1)
MONOCYTES NFR BLD AUTO: 7 %
NEUTROPHILS # BLD AUTO: 3.3 K/UL (ref 1.3–7.7)
NEUTROPHILS NFR BLD AUTO: 54 %
PLATELET # BLD AUTO: 152 K/UL (ref 150–450)
POTASSIUM SERPL-SCNC: 3.9 MMOL/L (ref 3.5–5.1)
PROT SERPL-MCNC: 6.3 G/DL (ref 6.3–8.2)
PT BLD: 10.7 SEC (ref 9–12)
SODIUM SERPL-SCNC: 139 MMOL/L (ref 137–145)
WBC # BLD AUTO: 6.2 K/UL (ref 3.8–10.6)

## 2020-06-09 PROCEDURE — 85730 THROMBOPLASTIN TIME PARTIAL: CPT

## 2020-06-09 PROCEDURE — 71046 X-RAY EXAM CHEST 2 VIEWS: CPT

## 2020-06-09 PROCEDURE — 96360 HYDRATION IV INFUSION INIT: CPT

## 2020-06-09 PROCEDURE — 85610 PROTHROMBIN TIME: CPT

## 2020-06-09 PROCEDURE — 36415 COLL VENOUS BLD VENIPUNCTURE: CPT

## 2020-06-09 PROCEDURE — 99285 EMERGENCY DEPT VISIT HI MDM: CPT

## 2020-06-09 PROCEDURE — 80053 COMPREHEN METABOLIC PANEL: CPT

## 2020-06-09 PROCEDURE — 83735 ASSAY OF MAGNESIUM: CPT

## 2020-06-09 PROCEDURE — 93005 ELECTROCARDIOGRAM TRACING: CPT

## 2020-06-09 PROCEDURE — 85025 COMPLETE CBC W/AUTO DIFF WBC: CPT

## 2020-06-09 PROCEDURE — 84484 ASSAY OF TROPONIN QUANT: CPT

## 2020-06-09 NOTE — XR
EXAMINATION TYPE: XR chest 2V

 

DATE OF EXAM: 6/9/2020

 

COMPARISON: 4/11/2018

 

HISTORY: Chest pain

 

TECHNIQUE:

 

FINDINGS: Heart and mediastinum are normal. Lungs are clear. Diaphragm is normal. Bony thorax appears
 normal.

 

IMPRESSION: Normal chest. No change.

## 2020-06-09 NOTE — ED
General Adult HPI





- General


Chief complaint: Chest Pain


Stated complaint: LT leg pain, chest pain


Time Seen by Provider: 06/09/20 20:47


Source: patient, RN notes reviewed, old records reviewed


Mode of arrival: ambulatory


Limitations: no limitations





- History of Present Illness


Initial comments: 


30-year-old male patient presents to ED for chief complaint of left knee pain.  

Patient reports that he has had issues of the anterior aspect of left knee for 

some time.  Reports that he started up today and he felt his knee buckle for 

denies having pain.  Reports the pains in the anterior aspect of the knee.  

Denies any posterior tenderness.  Patient also reports that approximately 1 PM 

today began experiencing some left chest pain underneath his pectoral region.  

He reports it hurts when he presses on it.  Reports that he has had this pain 

for years.  He denies any other complaints at this time.





Systemic: Pt denies fatigue, fever/chills, rash. Pt denies weakness, night 

sweats, weight loss. 


Neuro: Pt denies headache, visual disturbances, syncope or pre-syncope.


HEENT: Pt denies ocular discharge or irritation, otalgia, rhinorrhea, 

pharyngitis or notable lymphadenopathy. 


Cardiopulmonary: Pt denies SOB, heart palpitations, dyspnea on exertion.  


Abdominal/GI: Pt denies abdominal pain, n/v/d. 


: Pt denies dysuria, burning w/ urination, frequency/urgency. Denies new onset

urinary or bowel incontinence.  


MSK: Pt denies loss of strength or function in extremities. 


Neuro: Pt denies new onset weakness, paresthesias. 








- Related Data


                                Home Medications











 Medication  Instructions  Recorded  Confirmed


 


Bc Aspirin Fast Pain Relief Packet 1 packet PO ONCE PRN 06/09/20 06/09/20


 


Ibuprofen [Motrin] 600 mg PO ONCE PRN 06/09/20 06/09/20


 


Strattera (Unknown Strength) 1 tab PO DAILY 06/09/20 06/09/20


 


Unknown Depression/Anxiety Med 1 tab PO DAILY 06/09/20 06/09/20











                                    Allergies











Allergy/AdvReac Type Severity Reaction Status Date / Time


 


clonazepam [From Klonopin] Allergy  Anaphylaxis Verified 06/09/20 19:32














Review of Systems


ROS Statement: 


Those systems with pertinent positive or pertinent negative responses have been 

documented in the HPI.





ROS Other: All systems not noted in ROS Statement are negative.





Past Medical History


Additional Past Medical History / Comment(s): heart murmur, angina


History of Any Multi-Drug Resistant Organisms: None Reported


Past Surgical History: No Surgical Hx Reported


Additional Past Surgical History / Comment(s): Nasal surgery,


Past Psychological History: Bipolar, Depression


Smoking Status: Current every day smoker


Past Alcohol Use History: Occasional


Past Drug Use History: None Reported





General Exam





- General Exam Comments


Initial Comments: 





Constitutional: NAD, AOX3, Pt has pleasant affect. 


HEENT: NC/AT, trachea midline, neck supple, no lymphadenopathy. Posterior p

harynx non erythematous, without exudates. External ears appear normal, without 

discharge. Mucous membranes moist. Eyes PERRLA, EOM intact. There is no scleral 

icterus. No pallor noted. 


Cardiopulmonary: RRR, no murmurs, rubs or gallops, no JVD noted. Lungs CTAB in 

anterior and posterior fields. No peripheral edema. 


Abdominal exam: Abdomen soft and non-distended. Abdomen non-tender to palpation 

in all 4 quadrants. Bowel sounds active in LLQ. No hepatosplenomegaly. No 

ecchymosis


Neuro: CN II-XII grossly intact. No nuchal rigidity. No raccon eyes, no garcia 

sign, no hemotympanum. No cervical spinal tenderness. 


MSK: Anterior aspect of left knee is mildly tender to palpation. No skin 

changes. No posterior calf tenderness bilaterally, homans sign negative 

bilaterally. Posterior tibialis and radial pulse +2 bilaterally. Sensation 

intact in upper and lower extremities. Full active ROM in upper and lower 

extremities, 5/5 stregnth.  Left anterior chest wall is mildly tender to 

palpation.








Limitations: no limitations





Course





                                   Vital Signs











  06/09/20 06/09/20





  19:30 22:29


 


Temperature 98.0 F 98 F


 


Pulse Rate 69 63


 


Respiratory 16 17





Rate  


 


Blood Pressure 102/65 101/69


 


O2 Sat by Pulse 100 99





Oximetry  














Medical Decision Making





- Medical Decision Making





30-year-old male patient presents to ED for chief complaint of left knee pain.  

Patient reports that he has had issues of the anterior aspect of left knee for 

some time.  Reports that he started up today and he felt his knee buckle for 

denies having pain.  Reports the pains in the anterior aspect of the knee.  

Denies any posterior tenderness.  Patient also reports that approximately 1 PM 

today began experiencing some left chest pain underneath his pectoral region.  

He reports it hurts when he presses on it.  Reports that he has had this pain 

for years.  He denies any other complaints at this time.  Patient will signs are

stable, afebrile.  Physical exam displayed some tenderness to the anterior left 

knee.  Left lateral pectoral region is nontender palpation.  Patient reports 

this is the pain that he is experiencing.  Obtained investigations were 

obtained.  Troponin is negative.  EKG is nonischemic.  She reports that the pain

is reproducible in his chest with range of motion as well.  Patient is PERC 

negative.  Plain film of knee and chest x-ray did not display acute process.  

Patient chest pain yesterday muscular skeletal in nature.  Patient was placed in

knee immobilizer and will follow up with primary care provider as well as 

orthopedic surgeon.  Return to ER physician worsens.  Patient is clinically any 

chest pain this time.  Case discussed with Dr. Mccrary. 











- Lab Data


Result diagrams: 


                                 06/09/20 21:20





                                 06/09/20 21:20





                                   Lab Results











  06/09/20 06/09/20 06/09/20 Range/Units





  21:20 21:20 21:20 


 


WBC  6.2    (3.8-10.6)  k/uL


 


RBC  4.22 L    (4.30-5.90)  m/uL


 


Hgb  13.1    (13.0-17.5)  gm/dL


 


Hct  37.2 L    (39.0-53.0)  %


 


MCV  88.2    (80.0-100.0)  fL


 


MCH  31.0    (25.0-35.0)  pg


 


MCHC  35.1    (31.0-37.0)  g/dL


 


RDW  12.8    (11.5-15.5)  %


 


Plt Count  152    (150-450)  k/uL


 


Neutrophils %  54    %


 


Lymphocytes %  32    %


 


Monocytes %  7    %


 


Eosinophils %  4    %


 


Basophils %  1    %


 


Neutrophils #  3.3    (1.3-7.7)  k/uL


 


Lymphocytes #  2.0    (1.0-4.8)  k/uL


 


Monocytes #  0.4    (0-1.0)  k/uL


 


Eosinophils #  0.3    (0-0.7)  k/uL


 


Basophils #  0.1    (0-0.2)  k/uL


 


PT   10.7   (9.0-12.0)  sec


 


INR   1.0   (<1.2)  


 


APTT   28.3   (22.0-30.0)  sec


 


Sodium    139  (137-145)  mmol/L


 


Potassium    3.9  (3.5-5.1)  mmol/L


 


Chloride    106  ()  mmol/L


 


Carbon Dioxide    27  (22-30)  mmol/L


 


Anion Gap    6  mmol/L


 


BUN    6 L  (9-20)  mg/dL


 


Creatinine    0.91  (0.66-1.25)  mg/dL


 


Est GFR (CKD-EPI)AfAm    >90  (>60 ml/min/1.73 sqM)  


 


Est GFR (CKD-EPI)NonAf    >90  (>60 ml/min/1.73 sqM)  


 


Glucose    98  (74-99)  mg/dL


 


Calcium    8.9  (8.4-10.2)  mg/dL


 


Magnesium    2.0  (1.6-2.3)  mg/dL


 


Total Bilirubin    0.5  (0.2-1.3)  mg/dL


 


AST    19  (17-59)  U/L


 


ALT    11  (4-49)  U/L


 


Alkaline Phosphatase    53  ()  U/L


 


Troponin I     (0.000-0.034)  ng/mL


 


Total Protein    6.3  (6.3-8.2)  g/dL


 


Albumin    3.9  (3.5-5.0)  g/dL














  06/09/20 Range/Units





  21:20 


 


WBC   (3.8-10.6)  k/uL


 


RBC   (4.30-5.90)  m/uL


 


Hgb   (13.0-17.5)  gm/dL


 


Hct   (39.0-53.0)  %


 


MCV   (80.0-100.0)  fL


 


MCH   (25.0-35.0)  pg


 


MCHC   (31.0-37.0)  g/dL


 


RDW   (11.5-15.5)  %


 


Plt Count   (150-450)  k/uL


 


Neutrophils %   %


 


Lymphocytes %   %


 


Monocytes %   %


 


Eosinophils %   %


 


Basophils %   %


 


Neutrophils #   (1.3-7.7)  k/uL


 


Lymphocytes #   (1.0-4.8)  k/uL


 


Monocytes #   (0-1.0)  k/uL


 


Eosinophils #   (0-0.7)  k/uL


 


Basophils #   (0-0.2)  k/uL


 


PT   (9.0-12.0)  sec


 


INR   (<1.2)  


 


APTT   (22.0-30.0)  sec


 


Sodium   (137-145)  mmol/L


 


Potassium   (3.5-5.1)  mmol/L


 


Chloride   ()  mmol/L


 


Carbon Dioxide   (22-30)  mmol/L


 


Anion Gap   mmol/L


 


BUN   (9-20)  mg/dL


 


Creatinine   (0.66-1.25)  mg/dL


 


Est GFR (CKD-EPI)AfAm   (>60 ml/min/1.73 sqM)  


 


Est GFR (CKD-EPI)NonAf   (>60 ml/min/1.73 sqM)  


 


Glucose   (74-99)  mg/dL


 


Calcium   (8.4-10.2)  mg/dL


 


Magnesium   (1.6-2.3)  mg/dL


 


Total Bilirubin   (0.2-1.3)  mg/dL


 


AST   (17-59)  U/L


 


ALT   (4-49)  U/L


 


Alkaline Phosphatase   ()  U/L


 


Troponin I  <0.012  (0.000-0.034)  ng/mL


 


Total Protein   (6.3-8.2)  g/dL


 


Albumin   (3.5-5.0)  g/dL














- EKG Data


-: EKG Interpreted by Me (and Dr. Mccrary )


EKG Comments: 


Ventricular rate 61, para until 1:30, , QT/QTc 420 cyst 4.2.  Normal 

sinus rhythm, normal EKG, no concern for acute ischemia.








Disposition


Clinical Impression: 


 Knee sprain, Chest wall pain





Disposition: HOME SELF-CARE


Condition: Stable


Instructions (If sedation given, give patient instructions):  Chest Wall Pain 

(ED), Knee Sprain (ED)


Additional Instructions: 


Follow-up with primary care provider and orthopedic surgeon tomorrow.  Continue 

to use knee immobilizer.  Return to ER if condition worsens in any way.


Is patient prescribed a controlled substance at d/c from ED?: No


Referrals: 


Ilda Ontiveros MD [Primary Care Provider] - 1-2 days


Mason Rosas PAC [PHYSICIAN ASSISTANT] - 1-2 days

## 2020-06-09 NOTE — XR
EXAMINATION TYPE: XR knee complete LT

 

DATE OF EXAM: 6/9/2020

 

COMPARISON: NONE

 

HISTORY: Knee pain

 

TECHNIQUE: 3 views

 

FINDINGS: There is no sign of joint effusion. Joint spaces are normal. I see no fracture nor dislocat
ion.

 

IMPRESSION: Negative left knee exam. No fracture seen.

## 2020-06-10 VITALS — RESPIRATION RATE: 16 BRPM | SYSTOLIC BLOOD PRESSURE: 114 MMHG | DIASTOLIC BLOOD PRESSURE: 79 MMHG | HEART RATE: 58 BPM

## 2020-06-10 NOTE — ED
Medical Decision Making





- Medical Decision Making


Patient was advised to use crutches and not bear weight on the left lower 

extremity.  Patient reports that he does a lot of upper body movements with his 

job and that may be causing his pain   





- Lab Data


Result diagrams: 


                                 06/09/20 21:20





                                 06/09/20 21:20





                                   Lab Results











  06/09/20 06/09/20 06/09/20 Range/Units





  21:20 21:20 21:20 


 


WBC  6.2    (3.8-10.6)  k/uL


 


RBC  4.22 L    (4.30-5.90)  m/uL


 


Hgb  13.1    (13.0-17.5)  gm/dL


 


Hct  37.2 L    (39.0-53.0)  %


 


MCV  88.2    (80.0-100.0)  fL


 


MCH  31.0    (25.0-35.0)  pg


 


MCHC  35.1    (31.0-37.0)  g/dL


 


RDW  12.8    (11.5-15.5)  %


 


Plt Count  152    (150-450)  k/uL


 


Neutrophils %  54    %


 


Lymphocytes %  32    %


 


Monocytes %  7    %


 


Eosinophils %  4    %


 


Basophils %  1    %


 


Neutrophils #  3.3    (1.3-7.7)  k/uL


 


Lymphocytes #  2.0    (1.0-4.8)  k/uL


 


Monocytes #  0.4    (0-1.0)  k/uL


 


Eosinophils #  0.3    (0-0.7)  k/uL


 


Basophils #  0.1    (0-0.2)  k/uL


 


PT   10.7   (9.0-12.0)  sec


 


INR   1.0   (<1.2)  


 


APTT   28.3   (22.0-30.0)  sec


 


Sodium    139  (137-145)  mmol/L


 


Potassium    3.9  (3.5-5.1)  mmol/L


 


Chloride    106  ()  mmol/L


 


Carbon Dioxide    27  (22-30)  mmol/L


 


Anion Gap    6  mmol/L


 


BUN    6 L  (9-20)  mg/dL


 


Creatinine    0.91  (0.66-1.25)  mg/dL


 


Est GFR (CKD-EPI)AfAm    >90  (>60 ml/min/1.73 sqM)  


 


Est GFR (CKD-EPI)NonAf    >90  (>60 ml/min/1.73 sqM)  


 


Glucose    98  (74-99)  mg/dL


 


Calcium    8.9  (8.4-10.2)  mg/dL


 


Magnesium    2.0  (1.6-2.3)  mg/dL


 


Total Bilirubin    0.5  (0.2-1.3)  mg/dL


 


AST    19  (17-59)  U/L


 


ALT    11  (4-49)  U/L


 


Alkaline Phosphatase    53  ()  U/L


 


Troponin I     (0.000-0.034)  ng/mL


 


Total Protein    6.3  (6.3-8.2)  g/dL


 


Albumin    3.9  (3.5-5.0)  g/dL














  06/09/20 Range/Units





  21:20 


 


WBC   (3.8-10.6)  k/uL


 


RBC   (4.30-5.90)  m/uL


 


Hgb   (13.0-17.5)  gm/dL


 


Hct   (39.0-53.0)  %


 


MCV   (80.0-100.0)  fL


 


MCH   (25.0-35.0)  pg


 


MCHC   (31.0-37.0)  g/dL


 


RDW   (11.5-15.5)  %


 


Plt Count   (150-450)  k/uL


 


Neutrophils %   %


 


Lymphocytes %   %


 


Monocytes %   %


 


Eosinophils %   %


 


Basophils %   %


 


Neutrophils #   (1.3-7.7)  k/uL


 


Lymphocytes #   (1.0-4.8)  k/uL


 


Monocytes #   (0-1.0)  k/uL


 


Eosinophils #   (0-0.7)  k/uL


 


Basophils #   (0-0.2)  k/uL


 


PT   (9.0-12.0)  sec


 


INR   (<1.2)  


 


APTT   (22.0-30.0)  sec


 


Sodium   (137-145)  mmol/L


 


Potassium   (3.5-5.1)  mmol/L


 


Chloride   ()  mmol/L


 


Carbon Dioxide   (22-30)  mmol/L


 


Anion Gap   mmol/L


 


BUN   (9-20)  mg/dL


 


Creatinine   (0.66-1.25)  mg/dL


 


Est GFR (CKD-EPI)AfAm   (>60 ml/min/1.73 sqM)  


 


Est GFR (CKD-EPI)NonAf   (>60 ml/min/1.73 sqM)  


 


Glucose   (74-99)  mg/dL


 


Calcium   (8.4-10.2)  mg/dL


 


Magnesium   (1.6-2.3)  mg/dL


 


Total Bilirubin   (0.2-1.3)  mg/dL


 


AST   (17-59)  U/L


 


ALT   (4-49)  U/L


 


Alkaline Phosphatase   ()  U/L


 


Troponin I  <0.012  (0.000-0.034)  ng/mL


 


Total Protein   (6.3-8.2)  g/dL


 


Albumin   (3.5-5.0)  g/dL














Disposition


Clinical Impression: 


 Knee sprain, Chest wall pain





Disposition: HOME SELF-CARE


Condition: Stable


Instructions (If sedation given, give patient instructions):  Knee Sprain (ED), 

Chest Wall Pain (ED)


Additional Instructions: 


Follow-up with primary care provider and orthopedic surgeon tomorrow.  Continue 

to use knee immobilizer.  Return to ER if condition worsens in any way.


Is patient prescribed a controlled substance at d/c from ED?: No


Referrals: 


lIda Ontiveros MD [Primary Care Provider] - 1-2 days


Mason Rosas PAC [PHYSICIAN ASSISTANT] - 1-2 days

## 2020-08-06 ENCOUNTER — HOSPITAL ENCOUNTER (EMERGENCY)
Dept: HOSPITAL 47 - EC | Age: 30
Discharge: HOME | End: 2020-08-06
Payer: COMMERCIAL

## 2020-08-06 VITALS — TEMPERATURE: 98.6 F | DIASTOLIC BLOOD PRESSURE: 69 MMHG | SYSTOLIC BLOOD PRESSURE: 109 MMHG | HEART RATE: 73 BPM

## 2020-08-06 VITALS — RESPIRATION RATE: 16 BRPM

## 2020-08-06 DIAGNOSIS — F17.200: ICD-10-CM

## 2020-08-06 DIAGNOSIS — Z79.899: ICD-10-CM

## 2020-08-06 DIAGNOSIS — Z03.818: Primary | ICD-10-CM

## 2020-08-06 DIAGNOSIS — Z88.8: ICD-10-CM

## 2020-08-06 PROCEDURE — 99283 EMERGENCY DEPT VISIT LOW MDM: CPT

## 2020-08-06 NOTE — ED
General Adult HPI





- General


Source: patient


Mode of arrival: ambulatory


Limitations: no limitations





<Elba Leon - Last Filed: 08/06/20 16:33>





<Dyana Wilson - Last Filed: 08/11/20 08:30>





- General


Chief complaint: Recheck/Abnormal Lab/Rx


Stated complaint: COVID EXPOSURE


Time Seen by Provider: 08/06/20 16:20





- History of Present Illness


Initial comments: 





Patient is a 30-year-old male presenting to the emergency department with 

request of Covid test.  Patient states his wife just called him today stating 

that the  tested positive for Covid.  Patient states he was talking to

his boss about this and they made him come in to the ER to get tested.  He is 

not allowed back to work until his tests has resolved.  He denies any symptoms 

at this time.  He denies any fever, chills, nausea, vomiting, abdominal pain, 

cough or shortness of breath.  He denies history of asthma or COPD.  He has no 

further complaints.  Upon arrival to the ER, his vital signs are stable. 

(Elba Leon)





- Related Data


                                Home Medications











 Medication  Instructions  Recorded  Confirmed


 


Bc Aspirin Fast Pain Relief Packet 1 packet PO ONCE PRN 06/09/20 06/09/20


 


Ibuprofen [Motrin] 600 mg PO ONCE PRN 06/09/20 06/09/20


 


Strattera (Unknown Strength) 1 tab PO DAILY 06/09/20 06/09/20


 


Unknown Depression/Anxiety Med 1 tab PO DAILY 06/09/20 06/09/20











                                    Allergies











Allergy/AdvReac Type Severity Reaction Status Date / Time


 


clonazepam [From Klonopin] Allergy  Anaphylaxis Verified 08/06/20 16:18














Review of Systems


ROS Other: All systems not noted in ROS Statement are negative.





<Elba Leon - Last Filed: 08/06/20 16:33>


ROS Other: All systems not noted in ROS Statement are negative.





<Dyana Wilson - Last Filed: 08/11/20 08:30>


ROS Statement: 


Those systems with pertinent positive or pertinent negative responses have been 

documented in the HPI.








Past Medical History


Additional Past Medical History / Comment(s): heart murmur, angina


History of Any Multi-Drug Resistant Organisms: None Reported


Past Surgical History: No Surgical Hx Reported


Additional Past Surgical History / Comment(s): Nasal surgery,


Past Psychological History: Bipolar, Depression


Smoking Status: Current every day smoker, Vaper


Past Alcohol Use History: Occasional


Past Drug Use History: None Reported





<Elba Leon - Last Filed: 08/06/20 16:33>





General Exam


Limitations: no limitations





<CarolynElba ESAU - Last Filed: 08/06/20 16:33>





- General Exam Comments


Initial Comments: 





GENERAL: 


Patient is well-developed and well-nourished.  Patient is nontoxic and in no 

acute distress.





HEAD: 


Atraumatic, normocephalic.





EYES:


Pupils equal round and reactive to light, extraocular movements intact, sclera 

anicteric, conjunctiva are normal.  Eyelids were unremarkable.





ENT: 


TMs normal, nares patent, oropharynx clear without exudates.  Moist mucous 

membranes.





NECK: 


Normal range of motion, supple without lymphadenopathy or JVD.





LUNGS:


Unlabored respirations.  Breath sounds clear to auscultation bilaterally and 

equal.  No wheezes rales or rhonchi.





HEART:


Regular rate and rhythm without murmurs, rubs or gallops.





ABDOMEN: 


Soft, nontender, normoactive bowel sounds.  No guarding, no rebound.  No masses 

appreciated.





: Deferred 





MUSCULOSKELETAL: 


Normal extremities with adequate strength and normal range of motion, no pitting

or edema.  No clubbing or cyanosis.





NEUROLOGICAL: 


Normal speech, normal gait.   





PSYCH:


Normal mood, normal affect.





SKIN:


 Warm, Dry, normal turgor, no rashes or lesions noted. (Elba Leon)





Course





                                   Vital Signs











  08/06/20 08/06/20





  16:15 16:37


 


Temperature 98.6 F 


 


Pulse Rate 73 


 


Respiratory 20 16





Rate  


 


Blood Pressure 109/69 


 


O2 Sat by Pulse 97 





Oximetry  














Medical Decision Making





<Elba Leon - Last Filed: 08/06/20 16:33>





<Dyana Wilson - Last Filed: 08/11/20 08:30>





- Medical Decision Making





Patient is a 30-year-old male here for a Covid swab due to the  being 

positive for Covid today.  He has no symptoms.  His vital signs are stable.  His

exam is unremarkable.  Patient was swabbed for Covid and will be discharged 

home.  Patient will follow-up with PCP as needed.  Return parameters were 

discussed with the patient he verbalizes understanding.  Case discussed with Dr. Wilson. (Elba Leon)


I was available for consultation in the emergency department.  The history and 

physical exam were done by the midlevel provider. I was consulted for this 

patients care. I reviewed the case with the midlevel provider and based on 

their presentation of the patient, I agree with the assessment, medical decision

making and plan of care as documented.





Chart was dictated using Dragon dictation software.  Attempts were made to 

correct any dictation errors however some typographical errors may persist.





 (Dyana Wilson)





- Lab Data





                                   Lab Results











  08/06/20 Range/Units





  16:36 


 


Coronavirus (PCR)  Not Detected  (Not Detected)  














Disposition


Is patient prescribed a controlled substance at d/c from ED?: No





<Elba Leon - Last Filed: 08/06/20 16:33>





<Dyana Wilson - Last Filed: 08/11/20 08:30>


Clinical Impression: 


 Exposure to COVID-19 virus





Disposition: HOME SELF-CARE


Condition: Stable


Instructions (If sedation given, give patient instructions):  Normal Exam (ED)


Additional Instructions: 


Please return to the Emergency Department if symptoms worsen or any other 

concerns.


COVID test is pending.  


Referrals: 


Ilda Ontiveros MD [Primary Care Provider] - 1-2 days

## 2020-08-15 ENCOUNTER — HOSPITAL ENCOUNTER (EMERGENCY)
Dept: HOSPITAL 47 - EC | Age: 30
LOS: 1 days | Discharge: HOME | End: 2020-08-16
Payer: COMMERCIAL

## 2020-08-15 VITALS — RESPIRATION RATE: 18 BRPM

## 2020-08-15 DIAGNOSIS — I25.2: ICD-10-CM

## 2020-08-15 DIAGNOSIS — Z88.8: ICD-10-CM

## 2020-08-15 DIAGNOSIS — F31.9: ICD-10-CM

## 2020-08-15 DIAGNOSIS — Z79.82: ICD-10-CM

## 2020-08-15 DIAGNOSIS — K59.00: Primary | ICD-10-CM

## 2020-08-15 DIAGNOSIS — F17.290: ICD-10-CM

## 2020-08-15 PROCEDURE — 99284 EMERGENCY DEPT VISIT MOD MDM: CPT

## 2020-08-15 PROCEDURE — 83605 ASSAY OF LACTIC ACID: CPT

## 2020-08-15 PROCEDURE — 96374 THER/PROPH/DIAG INJ IV PUSH: CPT

## 2020-08-15 PROCEDURE — 80053 COMPREHEN METABOLIC PANEL: CPT

## 2020-08-15 PROCEDURE — 85025 COMPLETE CBC W/AUTO DIFF WBC: CPT

## 2020-08-15 PROCEDURE — 82150 ASSAY OF AMYLASE: CPT

## 2020-08-15 PROCEDURE — 96361 HYDRATE IV INFUSION ADD-ON: CPT

## 2020-08-15 PROCEDURE — 74177 CT ABD & PELVIS W/CONTRAST: CPT

## 2020-08-15 PROCEDURE — 83690 ASSAY OF LIPASE: CPT

## 2020-08-16 VITALS — DIASTOLIC BLOOD PRESSURE: 76 MMHG | HEART RATE: 73 BPM | SYSTOLIC BLOOD PRESSURE: 118 MMHG | TEMPERATURE: 97.7 F

## 2020-08-16 LAB
ALBUMIN SERPL-MCNC: 4.3 G/DL (ref 3.5–5)
ALP SERPL-CCNC: 73 U/L (ref 38–126)
ALT SERPL-CCNC: 20 U/L (ref 4–49)
AMYLASE SERPL-CCNC: 59 U/L (ref 30–110)
ANION GAP SERPL CALC-SCNC: 6 MMOL/L
AST SERPL-CCNC: 25 U/L (ref 17–59)
BASOPHILS # BLD AUTO: 0.1 K/UL (ref 0–0.2)
BASOPHILS NFR BLD AUTO: 1 %
BUN SERPL-SCNC: 13 MG/DL (ref 9–20)
CALCIUM SPEC-MCNC: 9.1 MG/DL (ref 8.4–10.2)
CHLORIDE SERPL-SCNC: 103 MMOL/L (ref 98–107)
CO2 SERPL-SCNC: 28 MMOL/L (ref 22–30)
EOSINOPHIL # BLD AUTO: 0.2 K/UL (ref 0–0.7)
EOSINOPHIL NFR BLD AUTO: 3 %
ERYTHROCYTE [DISTWIDTH] IN BLOOD BY AUTOMATED COUNT: 4.6 M/UL (ref 4.3–5.9)
ERYTHROCYTE [DISTWIDTH] IN BLOOD: 12.7 % (ref 11.5–15.5)
GLUCOSE SERPL-MCNC: 102 MG/DL (ref 74–99)
HCT VFR BLD AUTO: 41.7 % (ref 39–53)
HGB BLD-MCNC: 14.1 GM/DL (ref 13–17.5)
LYMPHOCYTES # SPEC AUTO: 1.8 K/UL (ref 1–4.8)
LYMPHOCYTES NFR SPEC AUTO: 23 %
MCH RBC QN AUTO: 30.6 PG (ref 25–35)
MCHC RBC AUTO-ENTMCNC: 33.7 G/DL (ref 31–37)
MCV RBC AUTO: 90.6 FL (ref 80–100)
MONOCYTES # BLD AUTO: 0.6 K/UL (ref 0–1)
MONOCYTES NFR BLD AUTO: 8 %
NEUTROPHILS # BLD AUTO: 5.2 K/UL (ref 1.3–7.7)
NEUTROPHILS NFR BLD AUTO: 65 %
PLATELET # BLD AUTO: 163 K/UL (ref 150–450)
POTASSIUM SERPL-SCNC: 4.2 MMOL/L (ref 3.5–5.1)
PROT SERPL-MCNC: 6.9 G/DL (ref 6.3–8.2)
SODIUM SERPL-SCNC: 137 MMOL/L (ref 137–145)
WBC # BLD AUTO: 8 K/UL (ref 3.8–10.6)

## 2020-08-16 NOTE — CT
EXAMINATION TYPE: CT abdomen pelvis w con

 

DATE OF EXAM: 8/16/2020

 

COMPARISON: None

 

HISTORY: pain

 

CT DLP: 548.8 mGycm

Automated exposure control for dose reduction was used.

 

CONTRAST: 

Performed with IV Contrast, patient injected with 100 mL of Isovue 300.

 

Images were obtained from the diaphragm to the floor the pelvis with IV contrast.

 

Lung bases are clear. There is no pleural effusion. Heart size is normal. There is no pericardial eff
usion.

 

Liver spleen stomach pancreas gallbladder appear normal. Bile ducts are not dilated. There is no adre
nal mass. Kidneys show satisfactory contrast opacification. There is no hydronephrosis. Ureters are n
ot dilated. Delayed images show normal renal excretion. There is no retroperitoneal adenopathy. Bladd
er appears empty.

 

There is no inguinal hernia. There is some retained fecal material in the large bowel. There is no as
cites. There is no mesenteric edema. There is no evidence of free air. Appendix is partly filled with
 air and is lateral and appears normal.

 

Lumbar spine is intact. Bony pelvis is intact.

 

IMPRESSION:

Large amount of retained fecal material in the large bowel. No free air. Normal appendix.

## 2020-08-16 NOTE — ED
Abdominal Pain HPI





- General


Chief Complaint: Abdominal Pain


Stated Complaint: Abd pain


Time Seen by Provider: 08/15/20 23:29


Source: patient


Mode of arrival: wheelchair


Limitations: no limitations





- History of Present Illness


Initial Comments: 


30-year-old male patient presents to the emergency department today for 

evaluation of abdominal pain mostly around the.  Umbilical region.  Patient 

states the pain has been present since around 5 PM and worsening.  States that 

he initially thought he was constipated so we did take a laxative.  States this 

didn't take his pain worse.  Patient states he has been nauseated but has not 

vomited.  Denies any fever or chills.  Denies any hematuria, dysuria, urinary 

urgency, urinary frequency.  Patient denies history of similar symptoms.  Denies

previous surgery to the abdomen. Patient denies any recent rash, cough, 

shortness of breath, chest pain, back pain, numbness, tingling, dizziness, 

weakness, headache, visual changes, or any other complaints.








- Related Data


                                Home Medications











 Medication  Instructions  Recorded  Confirmed


 


Bc Aspirin Fast Pain Relief Packet 1 packet PO ONCE PRN 06/09/20 06/09/20


 


RX: Ibuprofen [Motrin] 600 mg PO ONCE PRN 06/09/20 06/09/20


 


Strattera (Unknown Strength) 1 tab PO DAILY 06/09/20 06/09/20


 


Unknown Depression/Anxiety Med 1 tab PO DAILY 06/09/20 06/09/20








                                  Previous Rx's











 Medication  Instructions  Recorded


 


polyethylene glycoL 3350 [Miralax] 17 gm PO DAILY #30 packet 08/16/20











                                    Allergies











Allergy/AdvReac Type Severity Reaction Status Date / Time


 


clonazepam [From Klonopin] Allergy  Anaphylaxis Verified 08/15/20 23:20














Review of Systems


ROS Statement: 


Those systems with pertinent positive or pertinent negative responses have been 

documented in the HPI.





ROS Other: All systems not noted in ROS Statement are negative.





Past Medical History


Additional Past Medical History / Comment(s): heart murmur, angina


History of Any Multi-Drug Resistant Organisms: None Reported


Past Surgical History: No Surgical Hx Reported


Additional Past Surgical History / Comment(s): Nasal surgery,


Past Psychological History: Bipolar, Depression


Smoking Status: Current every day smoker, Vaper


Past Alcohol Use History: Occasional


Past Drug Use History: None Reported





General Exam


Limitations: no limitations


General appearance: alert, in no apparent distress, other (This is a well-

developed, well-nourished adult male patient in no acute distress.  Vital signs 

upon presentation are temperature 98.3F, pulse 77, respirations 18, blood 

pressure 104/72, pulse ox 98% on room air.)


Respiratory exam: Present: normal lung sounds bilaterally.  Absent: respiratory 

distress, wheezes, rales, rhonchi, stridor


Cardiovascular Exam: Present: regular rate, normal rhythm, normal heart sounds. 

Absent: systolic murmur, diastolic murmur, rubs, gallop, clicks


GI/Abdominal exam: Present: soft, tenderness (Generalized), guarding, rigid, 

normal bowel sounds.  Absent: distended, rebound


Neurological exam: Present: alert, oriented X3, CN II-XII intact


Psychiatric exam: Present: normal affect, normal mood


Skin exam: Present: warm, dry, intact, normal color.  Absent: rash





Course


                                   Vital Signs











  08/15/20





  23:17


 


Temperature 98.3 F


 


Pulse Rate 77


 


Respiratory 18





Rate 


 


Blood Pressure 104/72


 


O2 Sat by Pulse 98





Oximetry 














Medical Decision Making





- Medical Decision Making


30-year-old male patient presents to the emergency department today for 

evaluation of abdominal pain especially around the periumbilical region.  He did

take a laxative at home which did worsening symptoms.  Physical examination did 

reveal generalized abdominal tenderness with guarding.  Labs reviewed and are 

unremarkable.  CT abdomen and pelvis was obtained and showed retained fecal 

material in the large bowel.  Patient did receive 2 enemas in the emergency 

department, he did have a large bowel movement and does feel better.  He will be

discharged with prescription for MiraLAX.  He is instructed to follow-up with 

his primary care physician for recheck in 1-2 days.  Return parameters were 

discussed in detail.  He verbalizes understanding and agrees with this plan.








- Lab Data


Result diagrams: 


                                 08/16/20 00:01





                                 08/16/20 00:01


                                   Lab Results











  08/16/20 08/16/20 08/16/20 Range/Units





  00:01 00:01 00:01 


 


WBC  8.0    (3.8-10.6)  k/uL


 


RBC  4.60    (4.30-5.90)  m/uL


 


Hgb  14.1    (13.0-17.5)  gm/dL


 


Hct  41.7    (39.0-53.0)  %


 


MCV  90.6    (80.0-100.0)  fL


 


MCH  30.6    (25.0-35.0)  pg


 


MCHC  33.7    (31.0-37.0)  g/dL


 


RDW  12.7    (11.5-15.5)  %


 


Plt Count  163    (150-450)  k/uL


 


Neutrophils %  65    %


 


Lymphocytes %  23    %


 


Monocytes %  8    %


 


Eosinophils %  3    %


 


Basophils %  1    %


 


Neutrophils #  5.2    (1.3-7.7)  k/uL


 


Lymphocytes #  1.8    (1.0-4.8)  k/uL


 


Monocytes #  0.6    (0-1.0)  k/uL


 


Eosinophils #  0.2    (0-0.7)  k/uL


 


Basophils #  0.1    (0-0.2)  k/uL


 


Sodium   137   (137-145)  mmol/L


 


Potassium   4.2   (3.5-5.1)  mmol/L


 


Chloride   103   ()  mmol/L


 


Carbon Dioxide   28   (22-30)  mmol/L


 


Anion Gap   6   mmol/L


 


BUN   13   (9-20)  mg/dL


 


Creatinine   0.72   (0.66-1.25)  mg/dL


 


Est GFR (CKD-EPI)AfAm   >90   (>60 ml/min/1.73 sqM)  


 


Est GFR (CKD-EPI)NonAf   >90   (>60 ml/min/1.73 sqM)  


 


Glucose   102 H   (74-99)  mg/dL


 


Plasma Lactic Acid Zacarias    0.6 L  (0.7-2.0)  mmol/L


 


Calcium   9.1   (8.4-10.2)  mg/dL


 


Total Bilirubin   0.7   (0.2-1.3)  mg/dL


 


AST   25   (17-59)  U/L


 


ALT   20   (4-49)  U/L


 


Alkaline Phosphatase   73   ()  U/L


 


Total Protein   6.9   (6.3-8.2)  g/dL


 


Albumin   4.3   (3.5-5.0)  g/dL


 


Amylase   59   ()  U/L


 


Lipase   44   ()  U/L














- Radiology Data


Radiology results: report reviewed, image reviewed


CT abdomen and pelvis with contrast is obtained.  Report reviewed in its 

entirety.  Impression by Dr. Petty shows large amount of retained fecal 

material in the large bowel.  No free air.  Normal appendix.





Disposition


Clinical Impression: 


 Abdominal pain





Disposition: HOME SELF-CARE


Condition: Good


Instructions (If sedation given, give patient instructions):  Constipation (ED),

Abdominal Pain (ED)


Additional Instructions: 


Take MiraLAX as directed.  Increase fluids and physical activity.  Follow up 

with her primary care physician for recheck in 1-2 days.  Return to the 

emergency department immediately for any new, worsening, or concerning symptoms.


Prescriptions: 


polyethylene glycoL 3350 [Miralax] 17 gm PO DAILY #30 packet


Is patient prescribed a controlled substance at d/c from ED?: No


Referrals: 


Ilda Ontiveros MD [Primary Care Provider] - 1-2 days


Time of Disposition: 02:26

## 2020-09-16 ENCOUNTER — HOSPITAL ENCOUNTER (EMERGENCY)
Dept: HOSPITAL 47 - EC | Age: 30
Discharge: HOME | End: 2020-09-16
Payer: COMMERCIAL

## 2020-09-16 VITALS — SYSTOLIC BLOOD PRESSURE: 103 MMHG | RESPIRATION RATE: 12 BRPM | DIASTOLIC BLOOD PRESSURE: 70 MMHG | HEART RATE: 55 BPM

## 2020-09-16 VITALS — TEMPERATURE: 97.9 F

## 2020-09-16 DIAGNOSIS — S46.912A: Primary | ICD-10-CM
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
## 2020-09-16 DIAGNOSIS — F17.200: ICD-10-CM

## 2020-09-16 DIAGNOSIS — Z79.899: ICD-10-CM

## 2020-09-16 DIAGNOSIS — Z88.8: ICD-10-CM

## 2020-09-16 PROCEDURE — 99283 EMERGENCY DEPT VISIT LOW MDM: CPT

## 2020-09-16 NOTE — ED
Upper Extremity HPI





- General


Chief Complaint: Extremity Injury, Upper


Stated Complaint: arm pain


Time Seen by Provider: 09/16/20 10:23


Source: patient, RN notes reviewed


Mode of arrival: ambulatory


Limitations: no limitations





- History of Present Illness


Initial Comments: 


30-year-old male presents emergency Department chief complaint left shoulder arm

pain.  Patient states that this started after he became presented today.  

Patient states he has sharp stabbing pain just below his deltoid.  Patient 

states his pain with range of motion of his left shoulder he states he also 

hurts when he stretches at work.  Patient has no neck pain no headache no 

dizziness no weakness.  Patient states that he is right-hand dominant.  No other

complaints at this time.








- Related Data


                                Home Medications











 Medication  Instructions  Recorded  Confirmed


 


Bc Aspirin Fast Pain Relief Packet 1 packet PO ONCE PRN 06/09/20 06/09/20


 


Ibuprofen [Motrin] 600 mg PO ONCE PRN 06/09/20 06/09/20


 


Strattera (Unknown Strength) 1 tab PO DAILY 06/09/20 06/09/20


 


Unknown Depression/Anxiety Med 1 tab PO DAILY 06/09/20 06/09/20








                                  Previous Rx's











 Medication  Instructions  Recorded


 


polyethylene glycoL 3350 [Miralax] 17 gm PO DAILY #30 packet 08/16/20











                                    Allergies











Allergy/AdvReac Type Severity Reaction Status Date / Time


 


clonazepam [From Klonopin] Allergy  Anaphylaxis Verified 09/16/20 10:22














Review of Systems


ROS Statement: 


Those systems with pertinent positive or pertinent negative responses have been 

documented in the HPI.





ROS Other: All systems not noted in ROS Statement are negative.





Past Medical History


Additional Past Medical History / Comment(s): heart murmur, angina


History of Any Multi-Drug Resistant Organisms: None Reported


Past Surgical History: No Surgical Hx Reported


Additional Past Surgical History / Comment(s): Nasal surgery,


Past Psychological History: Bipolar, Depression


Smoking Status: Current every day smoker, Vaper


Past Alcohol Use History: Occasional


Past Drug Use History: None Reported





General Exam


Limitations: no limitations


General appearance: alert, in no apparent distress


Head exam: Present: atraumatic, normocephalic, normal inspection


Neck exam: Present: normal inspection, full ROM.  Absent: tenderness


Respiratory exam: Present: normal lung sounds bilaterally.  Absent: respiratory 

distress, wheezes, rales, rhonchi, stridor


Cardiovascular Exam: Present: regular rate, normal rhythm, normal heart sounds. 

Absent: systolic murmur, diastolic murmur, rubs, gallop, clicks


Extremities exam: Present: other (Her strength equal bilaterally neurovascular 

intact upper extremities, there is mild tenderness over the anterolateral 

portion of the shoulder there is some mild pain with resisted shoulder flexion 

and extension.  No pain with bicep or tricep strength)





Course





                                   Vital Signs











  09/16/20





  10:18


 


Temperature 98.0 F


 


Pulse Rate 61


 


Respiratory 18





Rate 


 


Blood Pressure 116/74


 


O2 Sat by Pulse 100





Oximetry 














Medical Decision Making





- Medical Decision Making





X-ray is unremarkable.  Patient is a left shoulder strain.  Patient was started 

on anti-inflammatories advised follow-up with orthopedics for possible MRI if no

improvement symptoms.





Disposition


Clinical Impression: 


 Left shoulder strain





Disposition: HOME SELF-CARE


Condition: Stable


Instructions (If sedation given, give patient instructions):  Shoulder Sprain 

(ED)


Additional Instructions: 


Please return to the Emergency Department if symptoms worsen or any other 

concerns.


Is patient prescribed a controlled substance at d/c from ED?: No


Referrals: 


Ilda Ontiveros MD [Primary Care Provider] - 1-2 days


Time of Disposition: 11:05

## 2020-09-16 NOTE — XR
EXAMINATION TYPE: XR shoulder complete LT

 

DATE OF EXAM: 9/16/2020

 

COMPARISON: NONE

 

HISTORY: Pain

 

TECHNIQUE:  Shoulder examined in 3 views

 

FINDINGS: 

The humeral head articulates with the glenoid.

The acromio-clavicular junction is normal.

No acute fractures or dislocations are evident.

 

A follow up study can be performed 7-10 days from acute trauma for continued pain.

 

IMPRESSION:

1. Normal three-view left Shoulder

## 2021-04-29 ENCOUNTER — HOSPITAL ENCOUNTER (EMERGENCY)
Dept: HOSPITAL 47 - EC | Age: 31
Discharge: HOME | End: 2021-04-29
Payer: COMMERCIAL

## 2021-04-29 VITALS
RESPIRATION RATE: 18 BRPM | TEMPERATURE: 97.7 F | SYSTOLIC BLOOD PRESSURE: 113 MMHG | HEART RATE: 78 BPM | DIASTOLIC BLOOD PRESSURE: 71 MMHG

## 2021-04-29 DIAGNOSIS — F17.200: ICD-10-CM

## 2021-04-29 DIAGNOSIS — L29.0: Primary | ICD-10-CM

## 2021-04-29 PROCEDURE — 99282 EMERGENCY DEPT VISIT SF MDM: CPT

## 2021-04-29 NOTE — ED
Skin/Abscess/FB HPI





- General


Chief complaint: Skin/Abscess/Foreign Body


Stated complaint: male gu


Time Seen by Provider: 04/29/21 11:00


Source: patient


Mode of arrival: ambulatory


Limitations: no limitations





- History of Present Illness


Initial comments: 


30-year-old male presents to emergency department the chief complaint of itching

at the anus.  Patient reports this been ongoing for approximately one week.  

Patient reports he attempted to keep it really dry but no significant impairment

symptoms.  Denies any bleeding or difficulty/pain with bowel movements.  Denies 

any history of hemorrhoids.  Denies abdominal pain nausea vomiting.  Denies any 

lesions.  Denies any diarrhea or constipation.








- Related Data


                                Home Medications











 Medication  Instructions  Recorded  Confirmed


 


No Known Home Medications  04/29/21 04/29/21











                                    Allergies











Allergy/AdvReac Type Severity Reaction Status Date / Time


 


clonazepam [From Klonopin] Allergy  Anaphylaxis Verified 04/29/21 11:24














Review of Systems


ROS Statement: 


Those systems with pertinent positive or pertinent negative responses have been 

documented in the HPI.





ROS Other: All systems not noted in ROS Statement are negative.





Past Medical History


Additional Past Medical History / Comment(s): heart murmur, angina


History of Any Multi-Drug Resistant Organisms: None Reported


Past Surgical History: No Surgical Hx Reported


Additional Past Surgical History / Comment(s): Nasal surgery,


Past Psychological History: Bipolar, Depression


Smoking Status: Current every day smoker, Vaper


Past Alcohol Use History: Occasional


Past Drug Use History: None Reported





General Exam


Limitations: no limitations


General appearance: alert, in no apparent distress


Head exam: Present: atraumatic, normocephalic, normal inspection


Eye exam: Present: normal appearance, PERRL, EOMI


Pupils: Present: normal accommodation


ENT exam: Present: normal exam, normal oropharynx, mucous membranes moist


Neck exam: Present: normal inspection, full ROM.  Absent: tenderness


Respiratory exam: Present: normal lung sounds bilaterally.  Absent: respiratory 

distress


Cardiovascular Exam: Present: regular rate, normal rhythm, normal heart sounds. 

Absent: systolic murmur


GI/Abdominal exam: Present: soft.  Absent: distended, tenderness, guarding, 

rebound


Rectal exam: Present: normal inspection.  Absent: hemorrhoids, tenderness


Extremities exam: Present: normal inspection, full ROM


Back exam: Present: normal inspection, full ROM


Neurological exam: Present: alert, oriented X3


Psychiatric exam: Present: normal affect, normal mood


Skin exam: Present: warm, dry, intact, normal color





Course


                                   Vital Signs











  04/29/21





  10:53


 


Temperature 97.7 F


 


Pulse Rate 78


 


Respiratory 18





Rate 


 


Blood Pressure 113/71


 


O2 Sat by Pulse 100





Oximetry 














Medical Decision Making





- Medical Decision Making





30-year-old male presents to emergency Department with chief complaint of anal 

itching.  On physical examination, no signs of hemorrhoids.  I suspect the 

patient has pruiritis ani.  We'll discharge with Desitin.  Advised him proper 

hygiene.  Return parameters discussed.  Follow with primary care.  Case 

discussed with 





Disposition


Clinical Impression: 


 Pruritus ani





Disposition: HOME SELF-CARE


Condition: Stable


Instructions (If sedation given, give patient instructions):  Anal Itching (ED)


Additional Instructions: 


Use over-the-counter Desitin cream. Please return to the Emergency Department if

symptoms worsen or any other concerns.


Is patient prescribed a controlled substance at d/c from ED?: No


Referrals: 


Ilda Ontiveros MD [Primary Care Provider] - 1-2 days


Time of Disposition: 11:20

## 2021-05-02 ENCOUNTER — HOSPITAL ENCOUNTER (EMERGENCY)
Dept: HOSPITAL 47 - EC | Age: 31
Discharge: HOME | End: 2021-05-02
Payer: COMMERCIAL

## 2021-05-02 VITALS — TEMPERATURE: 98.1 F | RESPIRATION RATE: 18 BRPM

## 2021-05-02 VITALS — HEART RATE: 92 BPM | DIASTOLIC BLOOD PRESSURE: 65 MMHG | SYSTOLIC BLOOD PRESSURE: 121 MMHG

## 2021-05-02 DIAGNOSIS — M25.571: Primary | ICD-10-CM

## 2021-05-02 DIAGNOSIS — F17.200: ICD-10-CM

## 2021-05-02 DIAGNOSIS — F32.9: ICD-10-CM

## 2021-05-02 PROCEDURE — 96372 THER/PROPH/DIAG INJ SC/IM: CPT

## 2021-05-02 PROCEDURE — 73630 X-RAY EXAM OF FOOT: CPT

## 2021-05-02 PROCEDURE — 99283 EMERGENCY DEPT VISIT LOW MDM: CPT

## 2021-05-02 NOTE — ED
Extremity Problem HPI





- General


Chief complaint: Extremity Problem,Nontraumatic


Stated complaint: rt foot pain


Time Seen by Provider: 05/02/21 11:17


Source: patient


Mode of arrival: wheelchair


Limitations: no limitations





- History of Present Illness


Initial comments: 





Patient is a 30-year-old male presenting to the emergency Department with 

complaints of right foot pain that started yesterday.  Patient states he awoke 2

nights ago with cramps in his right foot.  He states that went away and he was 

okay for the rest of the day.  He states yesterday the pain increased on the 

dorsal aspect of the right foot, today he has trouble walking.  No falls or 

injuries, no previous injuries or surgeries to the right lower extremity.  He 

denies any fever or chills.  He has no further complaints at this time.





- Related Data


                                Home Medications











 Medication  Instructions  Recorded  Confirmed


 


No Known Home Medications  04/29/21 04/29/21











                                    Allergies











Allergy/AdvReac Type Severity Reaction Status Date / Time


 


clonazepam [From Klonopin] Allergy  Anaphylaxis Verified 05/02/21 11:15














Review of Systems


ROS Statement: 


Those systems with pertinent positive or pertinent negative responses have been 

documented in the HPI.





ROS Other: All systems not noted in ROS Statement are negative.





Past Medical History


Additional Past Medical History / Comment(s): heart murmur, angina


History of Any Multi-Drug Resistant Organisms: None Reported


Past Surgical History: No Surgical Hx Reported


Additional Past Surgical History / Comment(s): Nasal surgery,


Past Psychological History: Bipolar, Depression


Smoking Status: Current every day smoker, Vaper


Past Alcohol Use History: Occasional


Past Drug Use History: None Reported





General Exam





- General Exam Comments


Initial Comments: 





GENERAL: 


Patient is well-developed and well-nourished.  Patient is nontoxic and in no 

acute distress.





HEAD: 


Atraumatic, normocephalic.





EYES:


Pupils equal round and reactive to light, extraocular movements intact, sclera 

anicteric, conjunctiva are normal.  Eyelids were unremarkable.





ENT: 


Nares patent, oropharynx clear without exudates.  Moist mucous membranes.





NECK: 


Normal range of motion, supple without lymphadenopathy or JVD.





LUNGS:


Unlabored respirations.  Breath sounds clear to auscultation bilaterally and 

equal.  No wheezes rales or rhonchi.





HEART:


Regular rate and rhythm without murmurs, rubs or gallops.





ABDOMEN: 


Soft, nontender, normoactive bowel sounds.  No guarding, no rebound.  No masses 

appreciated.





: Deferred 





MUSCULOSKELETAL: 


Normal extremities with adequate strength and normal range of motion, no pitting

or edema.  No clubbing or cyanosis.  Patient has pain with palpation of the 

dorsal aspect of the left foot, no swelling, neurovascular intact.  No signs of 

infection.





NEUROLOGICAL: 


Patient is alert and oriented x 3.   Symmetrical smile.  Normal speech, normal 

gait.   





PSYCH:


Normal mood, normal affect.





SKIN:


 Warm, Dry, normal turgor, no rashes or lesions noted.


Limitations: no limitations





Course





                                   Vital Signs











  05/02/21 05/02/21





  11:13 12:15


 


Temperature 98.1 F 


 


Pulse Rate 101 H 92


 


Respiratory 18 18





Rate  


 


Blood Pressure 113/77 121/65


 


O2 Sat by Pulse 98 99





Oximetry  














Medical Decision Making





- Medical Decision Making





Patient is a 30-year-old male here with right foot pain over the dorsal aspect 

2 days.  No injuries or trauma.  No redness, no signs of infection.  His 

neurovascular intact, full range of motion.  X-rays reveal no acute fractures or

dislocations.  I discussed with patient this is most likely a muscle strain.  

Recommended ice to the area, Tylenol or ibuprofen for any discomfort.  I did 

give him Toradol today.  He is in agreement this plan of care.  If symptoms pe

rsist he can follow up with orthopedics.  Case discussed with Dr. Holloway.





Disposition


Clinical Impression: 


 Right foot pain





Disposition: HOME SELF-CARE


Condition: Stable


Instructions (If sedation given, give patient instructions):  Arthralgia (ED)


Additional Instructions: 


Please return to the Emergency Department if symptoms worsen or any other 

concerns.


Apply ice to the area, take Tylenol or ibuprofen for discomfort.  


If symptoms persist after one week without improvement, follow up with 

orthopedics.


Is patient prescribed a controlled substance at d/c from ED?: No


Referrals: 


Ilda Ontiveros MD [Primary Care Provider] - 1-2 days


Kavon Medina DO [Doctor of Osteopathic Medicine] - 1-2 days


Time of Disposition: 12:33

## 2021-05-02 NOTE — XR
EXAMINATION TYPE: XR foot complete RT

 

DATE OF EXAM: 5/2/2021

 

COMPARISON: NONE

 

HISTORY: Pain

 

TECHNIQUE: Three views are submitted.

 

FINDINGS:

The osseous structures are intact.    There is no acute fracture or dislocation.   Joint spaces are p
reserved. Mild hypertrophic arthropathy of the first MTP joint. Tiny spur at the Achilles insertion o
f the calcaneus.

 

IMPRESSION:

1. No acute fracture or dislocation.  If symptoms persist, follow-up exam in 7 to 10 days could be ob
tained.  

2. Mild hypertrophic arthropathy first MTP joint

## 2021-08-18 ENCOUNTER — HOSPITAL ENCOUNTER (EMERGENCY)
Dept: HOSPITAL 47 - EC | Age: 31
Discharge: HOME | End: 2021-08-18
Payer: COMMERCIAL

## 2021-08-18 VITALS — SYSTOLIC BLOOD PRESSURE: 118 MMHG | HEART RATE: 78 BPM | RESPIRATION RATE: 20 BRPM | DIASTOLIC BLOOD PRESSURE: 64 MMHG

## 2021-08-18 VITALS — TEMPERATURE: 97.7 F

## 2021-08-18 DIAGNOSIS — N43.3: ICD-10-CM

## 2021-08-18 DIAGNOSIS — F31.9: ICD-10-CM

## 2021-08-18 DIAGNOSIS — N50.812: Primary | ICD-10-CM

## 2021-08-18 DIAGNOSIS — F17.200: ICD-10-CM

## 2021-08-18 LAB
PH UR: 8 [PH] (ref 5–8)
SP GR UR: 1.01 (ref 1–1.03)
UROBILINOGEN UR QL STRIP: <2 MG/DL (ref ?–2)

## 2021-08-18 PROCEDURE — 93975 VASCULAR STUDY: CPT

## 2021-08-18 PROCEDURE — 81003 URINALYSIS AUTO W/O SCOPE: CPT

## 2021-08-18 PROCEDURE — 99284 EMERGENCY DEPT VISIT MOD MDM: CPT

## 2021-08-18 PROCEDURE — 96372 THER/PROPH/DIAG INJ SC/IM: CPT

## 2021-08-18 PROCEDURE — 76870 US EXAM SCROTUM: CPT

## 2021-08-18 NOTE — ED
Male Urogenital HPI





- General


Chief complaint: Urogenital


Stated complaint: testicle pain


Time Seen by Provider: 08/18/21 14:07


Source: patient


Mode of arrival: ambulatory


Limitations: no limitations





- History of Present Illness


Initial comments: 





31-year-old male presents to emergency department with a chief complaint of 

testicular pain.  Patient reports that he began yesterday and is mostly 

localized to the left testicle.  Patient reports there is no associated swelling

or erythema to the region states the pain is exacerbated whenever he is standing

up.  Not significant tender to palpation.  There is a history of herpes but is 

not concerned for other STDs.  Denies any penile discharge.  Denies any 

obstructive or infectious urinary symptoms.  Denies nausea vomiting or pain in 

the groin.  Denies any lesions to the genitalia.





- Related Data


                                Home Medications











 Medication  Instructions  Recorded  Confirmed


 


No Known Home Medications  04/29/21 05/02/21











                                    Allergies











Allergy/AdvReac Type Severity Reaction Status Date / Time


 


clonazepam [From Klonopin] Allergy  Anaphylaxis Verified 08/18/21 12:57














Review of Systems


ROS Statement: 


Those systems with pertinent positive or pertinent negative responses have been 

documented in the HPI.





ROS Other: All systems not noted in ROS Statement are negative.





Past Medical History


Additional Past Medical History / Comment(s): heart murmur, angina


History of Any Multi-Drug Resistant Organisms: None Reported


Past Surgical History: No Surgical Hx Reported


Additional Past Surgical History / Comment(s): Nasal surgery,


Past Psychological History: Bipolar, Depression


Smoking Status: Current every day smoker, Vaper


Past Alcohol Use History: Occasional


Past Drug Use History: None Reported





General Exam


Limitations: no limitations


General appearance: alert, in no apparent distress


Head exam: Present: atraumatic, normocephalic, normal inspection


Eye exam: Present: normal appearance, PERRL, EOMI


Pupils: Present: normal accommodation


ENT exam: Present: normal exam, normal oropharynx, mucous membranes moist, TM's 

normal bilaterally, normal external ear exam


Neck exam: Present: normal inspection, full ROM.  Absent: tenderness, 

lymphadenopathy


Respiratory exam: Present: normal lung sounds bilaterally.  Absent: respiratory 

distress


Cardiovascular Exam: Present: regular rate, normal rhythm, normal heart sounds. 

Absent: systolic murmur


GI/Abdominal exam: Present: soft.  Absent: distended, tenderness, guarding


 exam: Present: normal inspection, circumcision.  Absent: testicular 

tenderness, urethral discharge, scrotal swelling, vertical testicular lie


Extremities exam: Present: normal inspection, full ROM.  Absent: tenderness


Back exam: Present: normal inspection, full ROM.  Absent: tenderness


Neurological exam: Present: alert, oriented X3


Psychiatric exam: Present: normal affect, normal mood


Skin exam: Present: warm, dry, intact, normal color





Course


                                   Vital Signs











  08/18/21





  12:55


 


Temperature 97.7 F


 


Pulse Rate 82


 


Respiratory 18





Rate 


 


Blood Pressure 108/70


 


O2 Sat by Pulse 99





Oximetry 














Medical Decision Making





- Medical Decision Making





31-year-old male presents to emergency Department with the chief complaint of 

testicular pain.  On physical examination, he does have mild tenderness to the 

left testicle but no swelling or palpable masses.  UA is unremarkable.  Scrotal 

ultrasound reveals bilateral hydroceles.  Patient was advised to follow-up with 

urology.  Strict return parameters were thoroughly discussed the patient was 

understanding and agreeable.  Case discussed with Dr. Gabriel.





- Lab Data


                                   Lab Results











  08/18/21 Range/Units





  14:36 


 


Urine Color  Yellow  


 


Urine Appearance  Clear  (Clear)  


 


Urine pH  8.0  (5.0-8.0)  


 


Ur Specific Gravity  1.010  (1.001-1.035)  


 


Urine Protein  Negative  (Negative)  


 


Urine Glucose (UA)  Negative  (Negative)  


 


Urine Ketones  Negative  (Negative)  


 


Urine Blood  Negative  (Negative)  


 


Urine Nitrite  Negative  (Negative)  


 


Urine Bilirubin  Negative  (Negative)  


 


Urine Urobilinogen  <2.0  (<2.0)  mg/dL


 


Ur Leukocyte Esterase  Negative  (Negative)  














Disposition


Clinical Impression: 


 Hydrocele, Testicular pain





Disposition: HOME SELF-CARE


Condition: Stable


Instructions (If sedation given, give patient instructions):  Testicle Pain 

(ED), Scrotal Pain (ED)


Additional Instructions: 


Please return to the Emergency Department if symptoms worsen or any other 

concerns.  Follow-up with urology.


Is patient prescribed a controlled substance at d/c from ED?: No


Referrals: 


Ilda Ontiveros MD [Primary Care Provider] - 1-2 days


Leonardo Huffman MD [STAFF PHYSICIAN] - 1-2 days


Time of Disposition: 15:45

## 2021-08-18 NOTE — US
EXAMINATION TYPE: US scrotum with doppler.  Grayscale and color Doppler Duplex imaging performed of t
he scrotum.

 

DATE OF EXAM: 8/18/2021

 

COMPARISON: NONE

 

CLINICAL HISTORY: left testicle pain. Sharp pain left testicle x 2 days

 

 

EXAM MEASUREMENTS:

 

TESTICLES:

Right Testicle:  4.4 x 2.2 x 2.8 cm cm

Left Testicle:  4.8 x 3.2 x 2.0 cm

 

EPIDIDYMIS HEAD:

Right Epididymis:  1.1 x 0.9 x 1.0  cm

Left Epididymis:  0.8 x 1.2 x 0.7 cm  

 

Doppler performed to assess for testicular vascularity; good bilateral color flow and waveforms are s
een.   There is no evidence of testicular torsion.

 

Presence of hydroceles:  yes, bilat

Presence of varicoceles:  no

 

No masses seen.

 

 

 

IMPRESSION:

Bilateral hydroceles noted.

## 2022-06-08 ENCOUNTER — HOSPITAL ENCOUNTER (EMERGENCY)
Dept: HOSPITAL 47 - EC | Age: 32
Discharge: HOME | End: 2022-06-08
Payer: COMMERCIAL

## 2022-06-08 VITALS — DIASTOLIC BLOOD PRESSURE: 76 MMHG | TEMPERATURE: 98.5 F | SYSTOLIC BLOOD PRESSURE: 107 MMHG | HEART RATE: 70 BPM

## 2022-06-08 VITALS — RESPIRATION RATE: 16 BRPM

## 2022-06-08 DIAGNOSIS — R10.9: Primary | ICD-10-CM

## 2022-06-08 DIAGNOSIS — F17.290: ICD-10-CM

## 2022-06-08 LAB
ALBUMIN SERPL-MCNC: 4.6 G/DL (ref 3.5–5)
ALP SERPL-CCNC: 48 U/L (ref 38–126)
ALT SERPL-CCNC: 18 U/L (ref 4–49)
AMYLASE SERPL-CCNC: 66 U/L (ref 30–110)
ANION GAP SERPL CALC-SCNC: 6 MMOL/L
AST SERPL-CCNC: 23 U/L (ref 17–59)
BASOPHILS # BLD AUTO: 0 K/UL (ref 0–0.2)
BASOPHILS NFR BLD AUTO: 1 %
BUN SERPL-SCNC: 13 MG/DL (ref 9–20)
CALCIUM SPEC-MCNC: 9 MG/DL (ref 8.4–10.2)
CHLORIDE SERPL-SCNC: 103 MMOL/L (ref 98–107)
CO2 SERPL-SCNC: 30 MMOL/L (ref 22–30)
EOSINOPHIL # BLD AUTO: 0.1 K/UL (ref 0–0.7)
EOSINOPHIL NFR BLD AUTO: 2 %
ERYTHROCYTE [DISTWIDTH] IN BLOOD BY AUTOMATED COUNT: 4.86 M/UL (ref 4.3–5.9)
ERYTHROCYTE [DISTWIDTH] IN BLOOD: 12 % (ref 11.5–15.5)
GLUCOSE SERPL-MCNC: 94 MG/DL (ref 74–99)
HCT VFR BLD AUTO: 42.8 % (ref 39–53)
HGB BLD-MCNC: 14.3 GM/DL (ref 13–17.5)
LIPASE SERPL-CCNC: 43 U/L (ref 23–300)
LYMPHOCYTES # SPEC AUTO: 1.3 K/UL (ref 1–4.8)
LYMPHOCYTES NFR SPEC AUTO: 31 %
MCH RBC QN AUTO: 29.4 PG (ref 25–35)
MCHC RBC AUTO-ENTMCNC: 33.5 G/DL (ref 31–37)
MCV RBC AUTO: 88 FL (ref 80–100)
MONOCYTES # BLD AUTO: 0.3 K/UL (ref 0–1)
MONOCYTES NFR BLD AUTO: 7 %
NEUTROPHILS # BLD AUTO: 2.4 K/UL (ref 1.3–7.7)
NEUTROPHILS NFR BLD AUTO: 57 %
PH UR: 6.5 [PH] (ref 5–8)
PLATELET # BLD AUTO: 159 K/UL (ref 150–450)
POTASSIUM SERPL-SCNC: 4.2 MMOL/L (ref 3.5–5.1)
PROT SERPL-MCNC: 7.1 G/DL (ref 6.3–8.2)
SODIUM SERPL-SCNC: 139 MMOL/L (ref 137–145)
SP GR UR: 1 (ref 1–1.03)
UROBILINOGEN UR QL STRIP: <2 MG/DL (ref ?–2)
WBC # BLD AUTO: 4.1 K/UL (ref 3.8–10.6)

## 2022-06-08 PROCEDURE — 83690 ASSAY OF LIPASE: CPT

## 2022-06-08 PROCEDURE — 96375 TX/PRO/DX INJ NEW DRUG ADDON: CPT

## 2022-06-08 PROCEDURE — 36415 COLL VENOUS BLD VENIPUNCTURE: CPT

## 2022-06-08 PROCEDURE — 99284 EMERGENCY DEPT VISIT MOD MDM: CPT

## 2022-06-08 PROCEDURE — 96361 HYDRATE IV INFUSION ADD-ON: CPT

## 2022-06-08 PROCEDURE — 82150 ASSAY OF AMYLASE: CPT

## 2022-06-08 PROCEDURE — 81003 URINALYSIS AUTO W/O SCOPE: CPT

## 2022-06-08 PROCEDURE — 96374 THER/PROPH/DIAG INJ IV PUSH: CPT

## 2022-06-08 PROCEDURE — 74176 CT ABD & PELVIS W/O CONTRAST: CPT

## 2022-06-08 PROCEDURE — 80053 COMPREHEN METABOLIC PANEL: CPT

## 2022-06-08 PROCEDURE — 85025 COMPLETE CBC W/AUTO DIFF WBC: CPT

## 2022-06-08 NOTE — ED
Abdominal Pain HPI





- General


Chief Complaint: Abdominal Pain


Stated Complaint: Abd Pain


Time Seen by Provider: 06/08/22 06:50


Source: patient, RN notes reviewed


Mode of arrival: ambulatory


Limitations: no limitations





- History of Present Illness


Initial Comments: 


This a 32-year-old male presents emergency Department chief complaint right 

flank pain.  Patient states a sudden onset of pain this morning after urinating.

 Patient states that pain is not increased with movement.  Patient denies nausea

no vomiting no diarrhea no constipation no prior abdominal surgery no history 

kidney stones.  Patient denies fevers or chills.








- Related Data


                                Home Medications











 Medication  Instructions  Recorded  Confirmed


 


No Known Home Medications  04/29/21 06/08/22











                                    Allergies











Allergy/AdvReac Type Severity Reaction Status Date / Time


 


clonazepam [From Klonopin] Allergy  Anaphylaxis Verified 06/08/22 09:30














Review of Systems


ROS Statement: 


Those systems with pertinent positive or pertinent negative responses have been 

documented in the HPI.





ROS Other: All systems not noted in ROS Statement are negative.





Past Medical History


Additional Past Medical History / Comment(s): heart murmur, angina


History of Any Multi-Drug Resistant Organisms: None Reported


Past Surgical History: No Surgical Hx Reported


Additional Past Surgical History / Comment(s): Nasal surgery,


Past Psychological History: Bipolar, Depression


Smoking Status: Current every day smoker, Vaper


Past Alcohol Use History: Occasional


Past Drug Use History: None Reported





General Exam


Limitations: no limitations


General appearance: alert, in no apparent distress


Head exam: Present: atraumatic, normocephalic, normal inspection


Eye exam: Present: normal appearance, PERRL, EOMI.  Absent: scleral icterus, 

conjunctival injection, periorbital swelling


ENT exam: Present: normal exam, normal oropharynx, mucous membranes moist


Neck exam: Present: normal inspection, full ROM.  Absent: tenderness, 

meningismus, lymphadenopathy


Respiratory exam: Present: normal lung sounds bilaterally.  Absent: respiratory 

distress, wheezes, rales, rhonchi, stridor


Cardiovascular Exam: Present: regular rate, normal rhythm, normal heart sounds. 

Absent: systolic murmur, diastolic murmur, rubs, gallop, clicks


GI/Abdominal exam: Present: soft, normal bowel sounds.  Absent: distended, 

tenderness, guarding, rebound, rigid


Back exam: Present: CVA tenderness (R).  Absent: CVA tenderness (L)


Neurological exam: Present: alert





Course


                                   Vital Signs











  06/08/22 06/08/22





  06:50 10:32


 


Temperature 98.2 F 


 


Pulse Rate 90 67


 


Respiratory 16 16





Rate  


 


Blood Pressure 105/72 114/75


 


O2 Sat by Pulse 98 100





Oximetry  














Medical Decision Making





- Medical Decision Making


CT lab urinalysis reviewed 

findings.Patientmayhavepassedstonegivenpatientsymptoms.Patientisafebrilenosignso


facuteappendicitis.Patientdischargedwithrightflankpainreturnparametersdiscussed.








- Lab Data


Result diagrams: 


                                 06/08/22 07:40





                                 06/08/22 07:40


                                   Lab Results











  06/08/22 06/08/22 06/08/22 Range/Units





  07:40 07:40 10:30 


 


WBC  4.1    (3.8-10.6)  k/uL


 


RBC  4.86    (4.30-5.90)  m/uL


 


Hgb  14.3    (13.0-17.5)  gm/dL


 


Hct  42.8    (39.0-53.0)  %


 


MCV  88.0    (80.0-100.0)  fL


 


MCH  29.4    (25.0-35.0)  pg


 


MCHC  33.5    (31.0-37.0)  g/dL


 


RDW  12.0    (11.5-15.5)  %


 


Plt Count  159    (150-450)  k/uL


 


MPV  9.9    


 


Neutrophils %  57    %


 


Lymphocytes %  31    %


 


Monocytes %  7    %


 


Eosinophils %  2    %


 


Basophils %  1    %


 


Neutrophils #  2.4    (1.3-7.7)  k/uL


 


Lymphocytes #  1.3    (1.0-4.8)  k/uL


 


Monocytes #  0.3    (0-1.0)  k/uL


 


Eosinophils #  0.1    (0-0.7)  k/uL


 


Basophils #  0.0    (0-0.2)  k/uL


 


Sodium   139   (137-145)  mmol/L


 


Potassium   4.2   (3.5-5.1)  mmol/L


 


Chloride   103   ()  mmol/L


 


Carbon Dioxide   30   (22-30)  mmol/L


 


Anion Gap   6   mmol/L


 


BUN   13   (9-20)  mg/dL


 


Creatinine   0.97   (0.66-1.25)  mg/dL


 


Est GFR (CKD-EPI)AfAm   >90   (>60 ml/min/1.73 sqM)  


 


Est GFR (CKD-EPI)NonAf   >90   (>60 ml/min/1.73 sqM)  


 


Glucose   94   (74-99)  mg/dL


 


Calcium   9.0   (8.4-10.2)  mg/dL


 


Total Bilirubin   1.0   (0.2-1.3)  mg/dL


 


AST   23   (17-59)  U/L


 


ALT   18   (4-49)  U/L


 


Alkaline Phosphatase   48   ()  U/L


 


Total Protein   7.1   (6.3-8.2)  g/dL


 


Albumin   4.6   (3.5-5.0)  g/dL


 


Amylase   66   ()  U/L


 


Lipase   43   ()  U/L


 


Urine Color    Light Yellow  


 


Urine Appearance    Clear  (Clear)  


 


Urine pH    6.5  (5.0-8.0)  


 


Ur Specific Gravity    1.005  (1.001-1.035)  


 


Urine Protein    Negative  (Negative)  


 


Urine Glucose (UA)    Negative  (Negative)  


 


Urine Ketones    Negative  (Negative)  


 


Urine Blood    Negative  (Negative)  


 


Urine Nitrite    Negative  (Negative)  


 


Urine Bilirubin    Negative  (Negative)  


 


Urine Urobilinogen    <2.0  (<2.0)  mg/dL


 


Ur Leukocyte Esterase    Negative  (Negative)  














Disposition


Clinical Impression: 


 Right flank pain





Disposition: HOME SELF-CARE


Condition: Stable


Instructions (If sedation given, give patient instructions):  Flank Pain (ED)


Additional Instructions: 


Please return to the Emergency Department if symptoms worsen or any other 

concerns.


Is patient prescribed a controlled substance at d/c from ED?: No


Referrals: 


Ilda Ontiveros MD [Primary Care Provider] - 1-2 days


Time of Disposition: 11:00

## 2022-06-08 NOTE — CT
EXAMINATION TYPE: CT abdomen pelvis wo con

 

DATE OF EXAM: 6/8/2022

 

COMPARISON: 8/16/2020

 

HISTORY: Right flank pain

 

CT DLP: 306.9 mGycm

 

Examination of the solid and hollow viscera is limited given the lack of contrast. Examination is fur
ther limited by lack of intra-abdominal fat.

 

FINDINGS: 

 

LUNG BASES: No evidence for nodule. No evidence for infiltrate.

 

LIVER/GB: The gallbladder is unremarkable. No space-occupying hepatic lesion.

 

PANCREAS: No pancreatic mass identified. No inflammatory process seen.

 

SPLEEN: No evidence for splenomegaly. No intrasplenic lesions seen.

 

ADRENALS: No adrenal nodules identified. No evidence for thickening.

 

KIDNEYS: No evidence for renal mass. No nephrolithiasis. No hydronephrosis.

 

BOWEL: Nonvisualization of the appendix. No evidence of bowel obstruction. No inflammatory process.

 

Lymph nodes: No evidence for adenopathy greater than 1 cm.

 

Abdominal aorta: Atheromatous changes seen. No evidence for aneurysm.

 

Genital organs: No significant abnormality.

 

Other: No significant abnormality.

 

IMPRESSION: 

NO EVIDENCE FOR HYDRONEPHROSIS OR NEPHROLITHIASIS. EXAMINATION IS LIMITED BY THE LACK OF INTRA-ABDOMI
NAL FAT. IF SYMPTOMS PERSIST CONSIDER REPEAT EXAMINATION WITH INTRAVENOUS AND GI CONTRAST.

## 2022-12-18 ENCOUNTER — HOSPITAL ENCOUNTER (EMERGENCY)
Dept: HOSPITAL 47 - EC | Age: 32
Discharge: HOME | End: 2022-12-18
Payer: COMMERCIAL

## 2022-12-18 VITALS
TEMPERATURE: 97.9 F | RESPIRATION RATE: 16 BRPM | HEART RATE: 58 BPM | SYSTOLIC BLOOD PRESSURE: 113 MMHG | DIASTOLIC BLOOD PRESSURE: 74 MMHG

## 2022-12-18 DIAGNOSIS — F31.9: ICD-10-CM

## 2022-12-18 DIAGNOSIS — Z88.8: ICD-10-CM

## 2022-12-18 DIAGNOSIS — Z20.822: ICD-10-CM

## 2022-12-18 DIAGNOSIS — F17.290: ICD-10-CM

## 2022-12-18 DIAGNOSIS — K52.9: Primary | ICD-10-CM

## 2022-12-18 LAB
ALBUMIN SERPL-MCNC: 5.6 G/DL (ref 3.5–5)
ALP SERPL-CCNC: 71 U/L (ref 38–126)
ALT SERPL-CCNC: 20 U/L (ref 4–49)
AMYLASE SERPL-CCNC: 87 U/L (ref 30–110)
ANION GAP SERPL CALC-SCNC: 12 MMOL/L
AST SERPL-CCNC: 25 U/L (ref 17–59)
BASOPHILS # BLD AUTO: 0.1 K/UL (ref 0–0.2)
BASOPHILS NFR BLD AUTO: 1 %
BUN SERPL-SCNC: 13 MG/DL (ref 9–20)
CALCIUM SPEC-MCNC: 10.1 MG/DL (ref 8.4–10.2)
CHLORIDE SERPL-SCNC: 105 MMOL/L (ref 98–107)
CO2 SERPL-SCNC: 23 MMOL/L (ref 22–30)
EOSINOPHIL # BLD AUTO: 0.1 K/UL (ref 0–0.7)
EOSINOPHIL NFR BLD AUTO: 1 %
ERYTHROCYTE [DISTWIDTH] IN BLOOD BY AUTOMATED COUNT: 5.63 M/UL (ref 4.3–5.9)
ERYTHROCYTE [DISTWIDTH] IN BLOOD: 12.8 % (ref 11.5–15.5)
GLUCOSE SERPL-MCNC: 98 MG/DL (ref 74–99)
HCT VFR BLD AUTO: 48.7 % (ref 39–53)
HGB BLD-MCNC: 17.4 GM/DL (ref 13–17.5)
LIPASE SERPL-CCNC: 55 U/L (ref 23–300)
LYMPHOCYTES # SPEC AUTO: 1.5 K/UL (ref 1–4.8)
LYMPHOCYTES NFR SPEC AUTO: 15 %
MCH RBC QN AUTO: 31 PG (ref 25–35)
MCHC RBC AUTO-ENTMCNC: 35.8 G/DL (ref 31–37)
MCV RBC AUTO: 86.5 FL (ref 80–100)
MONOCYTES # BLD AUTO: 0.4 K/UL (ref 0–1)
MONOCYTES NFR BLD AUTO: 4 %
NEUTROPHILS # BLD AUTO: 7.4 K/UL (ref 1.3–7.7)
NEUTROPHILS NFR BLD AUTO: 76 %
PLATELET # BLD AUTO: 206 K/UL (ref 150–450)
POTASSIUM SERPL-SCNC: 4.8 MMOL/L (ref 3.5–5.1)
PROT SERPL-MCNC: 9.2 G/DL (ref 6.3–8.2)
SODIUM SERPL-SCNC: 140 MMOL/L (ref 137–145)
WBC # BLD AUTO: 9.7 K/UL (ref 3.8–10.6)

## 2022-12-18 PROCEDURE — 96361 HYDRATE IV INFUSION ADD-ON: CPT

## 2022-12-18 PROCEDURE — 82150 ASSAY OF AMYLASE: CPT

## 2022-12-18 PROCEDURE — 85025 COMPLETE CBC W/AUTO DIFF WBC: CPT

## 2022-12-18 PROCEDURE — 99283 EMERGENCY DEPT VISIT LOW MDM: CPT

## 2022-12-18 PROCEDURE — 87502 INFLUENZA DNA AMP PROBE: CPT

## 2022-12-18 PROCEDURE — 83690 ASSAY OF LIPASE: CPT

## 2022-12-18 PROCEDURE — 96375 TX/PRO/DX INJ NEW DRUG ADDON: CPT

## 2022-12-18 PROCEDURE — 96374 THER/PROPH/DIAG INJ IV PUSH: CPT

## 2022-12-18 PROCEDURE — 80053 COMPREHEN METABOLIC PANEL: CPT

## 2022-12-18 PROCEDURE — 87635 SARS-COV-2 COVID-19 AMP PRB: CPT

## 2022-12-18 PROCEDURE — 36415 COLL VENOUS BLD VENIPUNCTURE: CPT

## 2022-12-18 NOTE — ED
Nausea/Vomiting/Diarrhea HPI





- General


Chief complaint: Nausea/Vomiting/Diarrhea


Stated complaint: Abd pain,Dizziness


Time Seen by Provider: 22 11:53


Source: patient, RN notes reviewed


Mode of arrival: ambulatory


Limitations: no limitations





- History of Present Illness


Initial comments: 


This is a 32-year-old male who presents to the emergency department for nausea, 

vomiting, and diarrhea.  States that over the last 1-2 days, he has had profuse 

diarrhea and vomiting.  Unsure how many times he has diarrhea each day.  Denies 

any recent antibiotic use.  He also feels congested and his ears feel plugged.  

Denies any coughing, chest pain, or difficulty breathing.  He states that his 

girlfriend felt ill about a week ago, and he wonders if he may have caught 

something from her.  Denies any blood in the stool or vomit. Denies any 

associated abdominal pain.





Denies any fevers, chills, sore throat, cough, dyspnea, chest pain, palpitation

s, abdominal pain, back pain, or headaches.





MD complaint: nausea, vomiting, diarrhea


Onset/Timin


-: days(s)


Associated Abdominal Pain: No





- Related Data


                                  Previous Rx's











 Medication  Instructions  Recorded


 


Loperamide [Imodium] 2 mg PO QID PRN #20 capsule 22


 


Ondansetron Odt [Zofran Odt] 4 mg PO Q8HR PRN #20 tab 22











                                    Allergies











Allergy/AdvReac Type Severity Reaction Status Date / Time


 


clonazepam [From Klonopin] Allergy  Anaphylaxis Verified 22 11:51














Review of Systems


ROS Statement: 


Those systems with pertinent positive or pertinent negative responses have been 

documented in the HPI.





ROS Other: All systems not noted in ROS Statement are negative.





Past Medical History


Additional Past Medical History / Comment(s): heart murmur, angina


History of Any Multi-Drug Resistant Organisms: None Reported


Past Surgical History: No Surgical Hx Reported


Additional Past Surgical History / Comment(s): Nasal surgery


Past Psychological History: Bipolar, Depression


Smoking Status: Current every day smoker, Vaper


Past Alcohol Use History: Occasional


Past Drug Use History: None Reported





General Exam


Limitations: no limitations


General appearance: alert, in no apparent distress


Head exam: Present: atraumatic, normocephalic, normal inspection


ENT exam: Present: TM's normal bilaterally, normal external ear exam


Respiratory exam: Present: normal lung sounds bilaterally.  Absent: respiratory 

distress, wheezes, rales, rhonchi, stridor


Cardiovascular Exam: Present: regular rate, normal rhythm, normal heart sounds. 

Absent: systolic murmur, diastolic murmur, rubs, gallop, clicks


GI/Abdominal exam: Present: soft, hyperactive bowel sounds.  Absent: distended, 

tenderness


Neurological exam: Present: alert, oriented X3, CN II-XII intact


Psychiatric exam: Present: normal affect, normal mood


Skin exam: Present: warm, dry, intact, normal color.  Absent: rash





Course


                                   Vital Signs











  22





  11:48 15:00


 


Temperature 97.5 F L 97.9 F


 


Pulse Rate 102 H 58 L


 


Respiratory 15 16





Rate  


 


Blood Pressure 109/85 113/74


 


O2 Sat by Pulse 100 99





Oximetry  














Medical Decision Making





- Medical Decision Making


This is a 32-year-old male who presents to the emergency department for nausea, 

vomiting, and diarrhea.  Lab work obtained and found to be nonactionable.  

Patient is negative for Covid and influenza.  At this point, symptoms are most 

consistent with a gastroenteritis or food poisoning.  Low suspicion for C. diff 

due to the lack of recent antibiotic use and lack of C. diff history.  He was 

given IV fluids, Toradol, and Zofran, which he states treated the nausea.  He 

was also given Imodium, however, he proceeded to have diarrhea in the emergency 

department and had to be given a pair of disposable pants.  Afterwards, he was 

given a dose of Bentyl and Lomotil.  Patient overall feeling better and requests

discharge home.  Prescription for Zofran and Imodium provided with dosing 

instructions reviewed.  He is instructed to slowly advance his diet as tolerated

and remain well-hydrated.





Return precautions reviewed in depth, the patient is instructed to return to the

emergency department with any new, worsening, or concerning symptoms. Patient 

verbalized understanding. 





This case was discussed in detail with the attending ED physician. Presentation,

findings, and treatment plan discussed in detail as well. 








- Lab Data


Result diagrams: 


                                 22 12:22 12:


                                   Lab Results











  22 Range/Units





  12: 12: 12: 


 


WBC  9.7    (3.8-10.6)  k/uL


 


RBC  5.63    (4.30-5.90)  m/uL


 


Hgb  17.4    (13.0-17.5)  gm/dL


 


Hct  48.7    (39.0-53.0)  %


 


MCV  86.5    (80.0-100.0)  fL


 


MCH  31.0    (25.0-35.0)  pg


 


MCHC  35.8    (31.0-37.0)  g/dL


 


RDW  12.8    (11.5-15.5)  %


 


Plt Count  206    (150-450)  k/uL


 


MPV  10.0    


 


Neutrophils %  76    %


 


Lymphocytes %  15    %


 


Monocytes %  4    %


 


Eosinophils %  1    %


 


Basophils %  1    %


 


Neutrophils #  7.4    (1.3-7.7)  k/uL


 


Lymphocytes #  1.5    (1.0-4.8)  k/uL


 


Monocytes #  0.4    (0-1.0)  k/uL


 


Eosinophils #  0.1    (0-0.7)  k/uL


 


Basophils #  0.1    (0-0.2)  k/uL


 


Sodium   140   (137-145)  mmol/L


 


Potassium   4.8   (3.5-5.1)  mmol/L


 


Chloride   105   ()  mmol/L


 


Carbon Dioxide   23   (22-30)  mmol/L


 


Anion Gap   12   mmol/L


 


BUN   13   (9-20)  mg/dL


 


Creatinine   1.02   (0.66-1.25)  mg/dL


 


Est GFR (CKD-EPI)AfAm   >90   (>60 ml/min/1.73 sqM)  


 


Est GFR (CKD-EPI)NonAf   >90   (>60 ml/min/1.73 sqM)  


 


Glucose   98   (74-99)  mg/dL


 


Calcium   10.1   (8.4-10.2)  mg/dL


 


Total Bilirubin   1.3   (0.2-1.3)  mg/dL


 


AST   25   (17-59)  U/L


 


ALT   20   (4-49)  U/L


 


Alkaline Phosphatase   71   ()  U/L


 


Total Protein   9.2 H   (6.3-8.2)  g/dL


 


Albumin   5.6 H   (3.5-5.0)  g/dL


 


Amylase   87   ()  U/L


 


Lipase   55   ()  U/L


 


Coronavirus (PCR)     (Not Detectd)  


 


Influenza Type A RNA    Not Detected  (Not Detectd)  


 


Influenza Type B (PCR)    Not Detected  (Not Detectd)  














  22 Range/Units





  12:27 


 


WBC   (3.8-10.6)  k/uL


 


RBC   (4.30-5.90)  m/uL


 


Hgb   (13.0-17.5)  gm/dL


 


Hct   (39.0-53.0)  %


 


MCV   (80.0-100.0)  fL


 


MCH   (25.0-35.0)  pg


 


MCHC   (31.0-37.0)  g/dL


 


RDW   (11.5-15.5)  %


 


Plt Count   (150-450)  k/uL


 


MPV   


 


Neutrophils %   %


 


Lymphocytes %   %


 


Monocytes %   %


 


Eosinophils %   %


 


Basophils %   %


 


Neutrophils #   (1.3-7.7)  k/uL


 


Lymphocytes #   (1.0-4.8)  k/uL


 


Monocytes #   (0-1.0)  k/uL


 


Eosinophils #   (0-0.7)  k/uL


 


Basophils #   (0-0.2)  k/uL


 


Sodium   (137-145)  mmol/L


 


Potassium   (3.5-5.1)  mmol/L


 


Chloride   ()  mmol/L


 


Carbon Dioxide   (22-30)  mmol/L


 


Anion Gap   mmol/L


 


BUN   (9-20)  mg/dL


 


Creatinine   (0.66-1.25)  mg/dL


 


Est GFR (CKD-EPI)AfAm   (>60 ml/min/1.73 sqM)  


 


Est GFR (CKD-EPI)NonAf   (>60 ml/min/1.73 sqM)  


 


Glucose   (74-99)  mg/dL


 


Calcium   (8.4-10.2)  mg/dL


 


Total Bilirubin   (0.2-1.3)  mg/dL


 


AST   (17-59)  U/L


 


ALT   (4-49)  U/L


 


Alkaline Phosphatase   ()  U/L


 


Total Protein   (6.3-8.2)  g/dL


 


Albumin   (3.5-5.0)  g/dL


 


Amylase   ()  U/L


 


Lipase   ()  U/L


 


Coronavirus (PCR)  Not Detected  (Not Detectd)  


 


Influenza Type A RNA   (Not Detectd)  


 


Influenza Type B (PCR)   (Not Detectd)  














Disposition


Clinical Impression: 


 Gastroenteritis





Disposition: HOME SELF-CARE


Instructions (If sedation given, give patient instructions):  Acute Nausea and 

Vomiting (ED), Acute Diarrhea (ED)


Additional Instructions: 


Return to the emergency department with any new, worsening, or concerning 

symptoms.  Take the Zofran up to every 8 hours as needed for nausea and 

vomiting.  Take the Imodium after each loose stool, no more than 8 tablets in a 

day.  If you run out of this medication, you can purchase it over-the-counter.  

Follow up with your primary care provider in 1-2 days.


Prescriptions: 


Loperamide [Imodium] 2 mg PO QID PRN #20 capsule


 PRN Reason: Diarrhea


Ondansetron Odt [Zofran Odt] 4 mg PO Q8HR PRN #20 tab


 PRN Reason: Nausea And Vomiting


Is patient prescribed a controlled substance at d/c from ED?: No


Referrals: 


None,Stated [Primary Care Provider] - 1-2 days

## 2023-09-14 ENCOUNTER — HOSPITAL ENCOUNTER (EMERGENCY)
Dept: HOSPITAL 47 - EC | Age: 33
Discharge: HOME | End: 2023-09-14
Payer: COMMERCIAL

## 2023-09-14 VITALS
RESPIRATION RATE: 16 BRPM | DIASTOLIC BLOOD PRESSURE: 76 MMHG | TEMPERATURE: 98.4 F | SYSTOLIC BLOOD PRESSURE: 117 MMHG | HEART RATE: 79 BPM

## 2023-09-14 DIAGNOSIS — Z88.8: ICD-10-CM

## 2023-09-14 DIAGNOSIS — F17.290: ICD-10-CM

## 2023-09-14 DIAGNOSIS — L30.4: Primary | ICD-10-CM

## 2023-09-14 DIAGNOSIS — Z86.59: ICD-10-CM

## 2023-09-14 PROCEDURE — 99283 EMERGENCY DEPT VISIT LOW MDM: CPT

## 2023-09-14 NOTE — ED
General Adult HPI





- General


Source: patient, RN notes reviewed


Mode of arrival: ambulatory


Limitations: no limitations





<Kavon Kaye - Last Filed: 09/14/23 10:26>





<Dyana Wilson - Last Filed: 09/17/23 22:59>





- General


Stated complaint: urogenital


Time Seen by Provider: 09/14/23 10:26





- History of Present Illness


Initial comments: 


33-year-old male present emergency from chief complaint of her rash.  Patient 

states he has peeling skin, redness and groin.  He states is unsure how long 

this is been going on.  It is itchy.


 (Kavon Kaye)


33 year old male presents with groin rash. Can not state how long it has been 

going on. Noticed the rash this am. Has redness and peeling skin to the area. 

Denies pain. Admits mild itch. Has not attempted to place any medications to the

site. Denies any contacts with similar rash. Denies use of any new products.   

(Dyana Wilson)





- Related Data


                                  Previous Rx's











 Medication  Instructions  Recorded


 


Loperamide [Imodium] 2 mg PO QID PRN #20 capsule 12/18/22


 


Ondansetron Odt [Zofran Odt] 4 mg PO Q8HR PRN #20 tab 12/18/22


 


Ibuprofen [Motrin] 600 mg PO Q8HR PRN #24 tab 08/29/23


 


Nystatin 100,000 Unit/gm Powd 1 applic TOPICAL BID #30 gram 09/14/23





[Mycostatin Powder]  











                                    Allergies











Allergy/AdvReac Type Severity Reaction Status Date / Time


 


clonazepam [From Klonopin] Allergy  Anaphylaxis Verified 09/14/23 10:27














Review of Systems


ROS Other: All systems not noted in ROS Statement are negative.





<Kavon Kaye - Last Filed: 09/14/23 10:26>


ROS Other: All systems not noted in ROS Statement are negative.





<Dyana iWlson - Last Filed: 09/17/23 22:59>


ROS Statement: 


Those systems with pertinent positive or pertinent negative responses have been 

documented in the HPI.








Past Medical History


Additional Past Medical History / Comment(s): heart murmur, angina


History of Any Multi-Drug Resistant Organisms: None Reported


Past Surgical History: No Surgical Hx Reported


Additional Past Surgical History / Comment(s): Nasal surgery


Past Psychological History: Bipolar, Depression


Smoking Status: Current every day smoker, Vaper


Past Alcohol Use History: Occasional


Past Drug Use History: None Reported





<Kavon Kaye - Last Filed: 09/14/23 10:26>





General Exam





<Kavon Kaye - Last Filed: 09/14/23 10:26>


General appearance: alert, in no apparent distress


 exam: Present: other (red, flaking skin to inguinal folds consistent with 

intertrigo)





<Dyana Wilson - Last Filed: 09/17/23 22:59>





- General Exam Comments


Initial Comments: 


Visual Physical Exam





Vital signs reviewed





General: Well-appearing, nontoxic, no acute distress.


Head: Normocephalic, atraumatic


Eyes: PERRLA, EOMI


ENT: Airway patent


Chest: Nonlabored breathing


Skin: No visual rash, normal skin tone


Neuro: Alert and oriented 3


Musculoskeletal: No gross abnormalities


 (Kavon Kaye)





Course


                                   Vital Signs











  09/14/23





  10:27


 


Temperature 98.4 F


 


Pulse Rate 79


 


Respiratory 16





Rate 


 


Blood Pressure 117/76


 


O2 Sat by Pulse 98





Oximetry 














Medical Decision Making





<Kavon Kaye - Last Filed: 09/14/23 10:26>





<Dyana Wilson - Last Filed: 09/17/23 22:59>





- Medical Decision Making





I performed a quick note portion of this chart signed Kavon Kaye PA-C


 (Kavon Kaye)


Was pt. sent in by a medical professional or institution (LINDA Ocasio, NP, urgent 

care, hospital, or nursing home...) When possible be specific


@  -No


Did you speak to anyone other than the patient for history (EMS, parent, family,

police, friend...)? What history was obtained from this source 


@  -No


Did you review nursing and triage notes (agree or disagree)?  Why? 


@  -I reviewed and agree with nursing and triage notes


Were old charts reviewed (outside hosp., previous admission, EMS record, old 

EKG, old radiological studies, urgent care reports/EKG's, nursing home records)?

Report findings 


@  -No old charts were reviewed


Differential Diagnosis (chest pain, altered mental status, abdominal pain women,

abdominal pain men, vaginal bleeding, weakness, fever, dyspnea, syncope, 

headache, dizziness, GI bleed, back pain, seizure, CVA, palpatations, mental 

health, musculoskeletal)? 


@  -yeast infection, staph infection, shingles, herpes, 


EKG interpreted by me (3pts min.).


@  -Not done


X-rays interpreted by me (1pt min.).


@  -None done


CT interpreted by me (1pt min.).


@  -None done


U/S interpreted by me (1pt. min.).


@  -not done


What testing was considered but not performed or refused? (CT, X-rays, U/S, 

labs)? Why?


@  -None


What meds were considered but not given or refused? Why?


@  -None


Did you discuss the management of the patient with other professionals 

(professionals i.e. , PA, NP, lab, RT, psych nurse, , , 

teacher, , )? Give summary


@  -No


Was smoking cessation discussed for >3mins.?


@  -No


Was critical care preformed (if so, how long)?


@  -No


Were there social determinants of health that impacted care today? How? 

(Homelessness, low income, unemployed, alcoholism, drug addiction, 

transportation, low edu. Level, literacy, decrease access to med. care, [facility], 

rehab)?


@  -No


Was there de-escalation of care discussed even if they declined (Discuss DNR or 

withdrawal of care, Hospice)? DNR status


@  -No


What co-morbidities impacted this encounter? (DM, HTN, Smoking, COPD, CAD, 

Cancer, CVA, ARF, Chemo, Hep., AIDS, mental health diagnosis, sleep apnea, 

morbid obesity)?


@  -None


Was patient admitted / discharged? Hospital course, mention meds given and 

route, prescriptions, significant lab abnormalities, going to OR and other 

pertinent info.


@  -Patient placed in room 30. Rash consistent with intertrigo. He will be 

started on nystatin. Instructed to follow up with PCP. Return for any new or 

worsening symptoms. 


Undiagnosed new problem with uncertain prognosis?


@  -No


Drug Therapy requiring intensive monitoring for toxicity (Heparin, Nitro, 

Insulin, Cardizem)?


@  -No


Were any procedures done?


@  -No


Diagnosis/symptom?


@  -acute intertrigo


Acute, or Chronic, or Acute on Chronic?


@  -acute 


Uncomplicated (without systemic symptoms) or Complicated (systemic symptoms)?


@  -uncomplicated


Side effects of treatment?


@  -No


Exacerbation, Progression, or Severe Exacerbation?


@  -no


Poses a threat to life or bodily function? How? (Chest pain, USA, MI, pneumonia,

PE, COPD, DKA, ARF, appy, cholecystitis, CVA, Diverticulitis, Homicidal, 

Suicidal, threat to staff... and all critical care pts)


@  -No (Dyana Wilson)





Disposition





<Kavon Kaye - Last Filed: 09/14/23 10:26>


Is patient prescribed a controlled substance at d/c from ED?: No


Time of Disposition: 14:04





<Dyana Wilson - Last Filed: 09/17/23 22:59>


Clinical Impression: 


 Intertrigo





Disposition: HOME SELF-CARE


Condition: Stable


Instructions (If sedation given, give patient instructions):  Skin Yeast 

Infection (ED)


Additional Instructions: 


Take a shower, dry off well, then placed the cream to the site twice a day.  

Follow-up with your doctor and return for any new or worsening symptoms


Prescriptions: 


Nystatin 100,000 Unit/gm Powd [Mycostatin Powder] 1 applic TOPICAL BID #30 gram


Referrals: 


None,Stated [Primary Care Provider] - 1-2 days

## 2023-10-21 ENCOUNTER — HOSPITAL ENCOUNTER (EMERGENCY)
Dept: HOSPITAL 47 - EC | Age: 33
Discharge: HOME | End: 2023-10-21
Payer: COMMERCIAL

## 2023-10-21 VITALS — HEART RATE: 70 BPM | SYSTOLIC BLOOD PRESSURE: 122 MMHG | DIASTOLIC BLOOD PRESSURE: 74 MMHG

## 2023-10-21 VITALS — TEMPERATURE: 98 F

## 2023-10-21 VITALS — RESPIRATION RATE: 18 BRPM

## 2023-10-21 DIAGNOSIS — R09.81: Primary | ICD-10-CM

## 2023-10-21 DIAGNOSIS — Z88.5: ICD-10-CM

## 2023-10-21 DIAGNOSIS — Z86.59: ICD-10-CM

## 2023-10-21 DIAGNOSIS — F17.290: ICD-10-CM

## 2023-10-21 DIAGNOSIS — Z20.822: ICD-10-CM

## 2023-10-21 PROCEDURE — 99283 EMERGENCY DEPT VISIT LOW MDM: CPT

## 2023-10-21 PROCEDURE — 87636 SARSCOV2 & INF A&B AMP PRB: CPT

## 2023-10-21 NOTE — ED
General Adult HPI





- General


Chief complaint: ENT


Stated complaint: MIKHAIL, Congestion


Time Seen by Provider: 10/21/23 10:13


Source: patient, RN notes reviewed


Mode of arrival: ambulatory


Limitations: no limitations





- History of Present Illness


Initial comments: 





33-year-old  male with no significant past medical history presents the

emergency department with a chief complaint of congestion.  Patient reports 

stuffy and runny nose last couple of days.  He reports that he was at work and 

thought that his symptoms and his work recommended he be tested for Covid.  He 

denies any known fevers, sore throat, productive cough, nausea, vomiting.  

Denies recent sick contacts.





- Related Data


                                  Previous Rx's











 Medication  Instructions  Recorded


 


Loperamide [Imodium] 2 mg PO QID PRN #20 capsule 12/18/22


 


Ondansetron Odt [Zofran Odt] 4 mg PO Q8HR PRN #20 tab 12/18/22


 


Ibuprofen [Motrin] 600 mg PO Q8HR PRN #24 tab 08/29/23


 


Nystatin 100,000 Unit/gm Powd 1 applic TOPICAL BID #30 gram 09/14/23





[Mycostatin Powder]  











                                    Allergies











Allergy/AdvReac Type Severity Reaction Status Date / Time


 


clonazepam [From Klonopin] Allergy  Anaphylaxis Verified 10/21/23 10:12














Review of Systems


ROS Statement: 


Those systems with pertinent positive or pertinent negative responses have been 

documented in the HPI.





ROS Other: All systems not noted in ROS Statement are negative.





Past Medical History


Additional Past Medical History / Comment(s): heart murmur, angina


History of Any Multi-Drug Resistant Organisms: None Reported


Past Surgical History: No Surgical Hx Reported


Additional Past Surgical History / Comment(s): Nasal surgery


Past Psychological History: Bipolar, Depression


Smoking Status: Current every day smoker, Vaper


Past Alcohol Use History: Occasional


Past Drug Use History: None Reported





General Exam





- General Exam Comments


Initial Comments: 





General: Alert, in no acute distress


Head: atraumatic normocephalic.  Eyes PERRL, EOMI intact, mucous membranes moist




Respiratory: Lungs clear to auscultation bilaterally


Cardiovascular: Rate regular rate and rhythm


Abdominal: Soft without guarding or rebound


Extremities: Normal inspection with full range of motion and normal capillary 

refill


Neuroogic: alert and oriented 3, CN II-XII intact, able to ambulate with steady

gait 


Skin: warm dry and intact with normal color


Limitations: no limitations





Course


                                   Vital Signs











  10/21/23 10/21/23 10/21/23





  10:12 10:27 11:42


 


Temperature 98 F  


 


Pulse Rate 90  70


 


Respiratory 16 18 18





Rate   


 


Blood Pressure 114/73  122/74


 


O2 Sat by Pulse 99  99





Oximetry   














- Reevaluation(s)


Reevaluation #1: 





10/21/23 11:22


Patient reevaluated and updated on negative Covid results.  He is agreeable with

the plan for discharge home.





Medical Decision Making





- Medical Decision Making





Was pt. sent in by a medical professional or institution (, PA, NP, urgent 

care, hospital, or nursing home...) When possible be specific


@  -[No]


Did you speak to anyone other than the patient for history (EMS, parent, family,

 police, friend...)? What history was obtained from this source 


@  -[No]


Did you review nursing and triage notes (agree or disagree)?  Why? 


@  -[I reviewed and agree with nursing and triage notes]


Were old charts reviewed (outside hosp., previous admission, EMS record, old 

EKG, old radiological studies, urgent care reports/EKG's, nursing home records)?

Report findings 


@  -[No old charts were reviewed]


Differential Diagnosis (chest pain, altered mental status, abdominal pain women,

abdominal pain men, vaginal bleeding, weakness, fever, dyspnea, syncope, 

headache, dizziness, GI bleed, back pain, seizure, CVA, palpatations, mental 

health, musculoskeletal)? 


@  -[not applicable]


EKG interpreted by me (3pts min.).


@  -[As above]


X-rays interpreted by me (1pt min.).


@  -[None done]


CT interpreted by me (1pt min.).


@  -[None done]


U/S interpreted by me (1pt. min.).


@  -[None done]


What testing was considered but not performed or refused? (CT, X-rays, U/S, 

labs)? Why?


@  - Considered however patient is complaining of a cough or fever


What meds were considered but not given or refused? Why?


@  -[None]


Did you discuss the management of the patient with other professionals 

(professionals i.e. , PA, NP, lab, RT, psych nurse, , , 

teacher, , )? Give summary


@  -[No]


Was smoking cessation discussed for >3mins.?


@  -[No]


Was critical care preformed (if so, how long)?


@  -[No]


Were there social determinants of health that impacted care today? How? 

(Homelessness, low income, unemployed, alcoholism, drug addiction, 

transportation, low edu. Level, literacy, decrease access to med. care, prison, 

rehab)?


@  -[No]


Was there de-escalation of care discussed even if they declined (Discuss DNR or 

withdrawal of care, Hospice)? DNR status


@  -[No]


What co-morbidities impacted this encounter? (DM, HTN, Smoking, COPD, CAD, 

Cancer, CVA, ARF, Chemo, Hep., AIDS, mental health diagnosis, sleep apnea, 

morbid obesity)?


@  -[None]


Was patient admitted / discharged? Hospital course, mention meds given and 

route, prescriptions, significant lab abnormalities, going to OR and other 

pertinent info.


@  -[Discharged.  This is a pleasant 33-year-old  male presents the 

emergency department for Covid test.  Patient is a thorough history and physical

 exam performed on the ED.  Exam is essentially unremarkable.  Heart rate 

regular rate and rhythm, lungs clear to auscultation bilaterally.  Abdomen soft 

and nontender.  Vital signs are stable upon evaluation.  Patient afebrile.  

Patient had negative Covid results.  I discussed the results in detail with the 

patient verbalized understanding all questions addressed.  Agreeable for plan 

for discharge.  Patient discharged in stable condition.  Case discussed with Dr. ley [facility] who agrees with POC 


Undiagnosed new problem with uncertain prognosis?


@  -[No]


Drug Therapy requiring intensive monitoring for toxicity (Heparin, Nitro, 

Insulin, Cardizem)?


@  -[No]


Were any procedures done?


@  -[No]


Diagnosis/symptom?


@  -Encounter for COVID test 


Acute, or Chronic, or Acute on Chronic?


@  -Acute 


Uncomplicated (without systemic symptoms) or Complicated (systemic symptoms)?


@  -Uncomplicated


Side effects of treatment?


@  -[No]


Exacerbation, Progression, or Severe Exacerbation?


@  -[No]


Poses a threat to life or bodily function? How? (Chest pain, USA, MI, pneumonia,

 PE, COPD, DKA, ARF, appy, cholecystitis, CVA, Diverticulitis, Homicidal, 

Suicidal, threat to staff... and all critical care pts)


@  -Low likelihood 





- Lab Data


                                   Lab Results











  10/21/23 Range/Units





  10:29 


 


Influenza Type A (PCR)  Not Detected  (Not Detectd)  


 


Influenza Type B (PCR)  Not Detected  (Not Detectd)  


 


RSV (PCR)  Not Detected  (Not Detectd)  


 


SARS-CoV-2 (PCR)  Not Detected  (Not Detectd)  














Disposition


Clinical Impression: 


 Encounter for laboratory testing for COVID-19 virus





Disposition: HOME SELF-CARE


Condition: Stable


Instructions (If sedation given, give patient instructions):  

Decongestant/Expectorant (By mouth), Cold Symptoms (ED)


Additional Instructions: 


Please monitor symptoms closely





Please return to the nearest emergency department if symptoms worsen or persist


Is patient prescribed a controlled substance at d/c from ED?: No


Referrals: 


None,Stated [Primary Care Provider] - 1-2 days


Forms:   [facility]


Time of Disposition: 11:23

## 2023-11-13 ENCOUNTER — HOSPITAL ENCOUNTER (EMERGENCY)
Dept: HOSPITAL 47 - EC | Age: 33
Discharge: HOME | End: 2023-11-13
Payer: COMMERCIAL

## 2023-11-13 VITALS
HEART RATE: 67 BPM | DIASTOLIC BLOOD PRESSURE: 86 MMHG | RESPIRATION RATE: 18 BRPM | TEMPERATURE: 98.6 F | SYSTOLIC BLOOD PRESSURE: 121 MMHG

## 2023-11-13 DIAGNOSIS — Z88.8: ICD-10-CM

## 2023-11-13 DIAGNOSIS — Z86.59: ICD-10-CM

## 2023-11-13 DIAGNOSIS — B34.9: Primary | ICD-10-CM

## 2023-11-13 DIAGNOSIS — Z20.822: ICD-10-CM

## 2023-11-13 DIAGNOSIS — F17.290: ICD-10-CM

## 2023-11-13 PROCEDURE — 87636 SARSCOV2 & INF A&B AMP PRB: CPT

## 2023-11-13 PROCEDURE — 87651 STREP A DNA AMP PROBE: CPT

## 2023-11-13 PROCEDURE — 99285 EMERGENCY DEPT VISIT HI MDM: CPT

## 2023-11-13 PROCEDURE — 71046 X-RAY EXAM CHEST 2 VIEWS: CPT

## 2023-11-13 NOTE — XR
EXAMINATION TYPE: XR chest 2V

 

DATE OF EXAM: 11/13/2023

 

COMPARISON: 6/9/2020

 

HISTORY: Chest pain

 

TECHNIQUE:  Frontal and lateral views of the chest are obtained.

 

FINDINGS:  

 

There is no focal air space opacity.

 

No evidence for pneumothorax.  No pleural effusion.

 

The cardiac silhouette size is within normal limits.

 

The osseous structures are grossly intact.

 

IMPRESSION:  

 

1.  No acute cardiopulmonary process.

## 2023-11-13 NOTE — ED
General Adult HPI





- General


Chief complaint: Upper Respiratory Infection


Stated complaint: sob hard time breathing


Time Seen by Provider: 11/13/23 12:20


Source: patient, RN notes reviewed, old records reviewed


Mode of arrival: ambulatory


Limitations: no limitations





- History of Present Illness


Initial comments: 





This is a 33-year-old male who presents emergency Department complaining for 

about 6 days upper respiratory infection and sore throat.  Patient states he has

the chills but hasn't taken his temperature.  Patient states he has a little bit

of a dry cough.  Patient states he's not short of breath but he feels like he 

occasionally has sticky deep breath per patient denies chest pain or 

palpitations.  Patient denies any abdominal pain patient denies nausea vomiting 

diarrhea.  Patient states he's been around kids who have been sick recently.





- Related Data


                                Home Medications











 Medication  Instructions  Recorded  Confirmed


 


No Known Home Medications  11/13/23 11/13/23











                                    Allergies











Allergy/AdvReac Type Severity Reaction Status Date / Time


 


clonazepam [From Klonopin] Allergy  Anaphylaxis Verified 11/13/23 14:14














Review of Systems


ROS Statement: 


Those systems with pertinent positive or pertinent negative responses have been 

documented in the HPI.





ROS Other: All systems not noted in ROS Statement are negative.





Past Medical History


Additional Past Medical History / Comment(s): heart murmur, angina


History of Any Multi-Drug Resistant Organisms: None Reported


Past Surgical History: No Surgical Hx Reported


Additional Past Surgical History / Comment(s): Nasal surgery


Past Psychological History: Bipolar, Depression


Smoking Status: Current every day smoker, Vaper


Past Alcohol Use History: Occasional


Past Drug Use History: None Reported





General Exam





- General Exam Comments


Initial Comments: 





GENERAL:


Patient is well-developed and well-nourished.  Patient is nontoxic and well-

hydrated and is in mild distress.





ENT:


Neck is soft and supple.  No significant lymphadenopathy is noted.  Oropharynx 

is clear.  Moist mucous membranes.  Neck has full range of motion without 

eliciting any pain. 





EYES:


The sclera were anicteric and conjunctiva were pink and moist.  Extraocular 

movements were intact and pupils were equal round and reactive to light.  

Eyelids were unremarkable.





PULMONARY:


Unlabored respirations.  Good breath sounds bilaterally.  No audible rales 

rhonchi or wheezing was noted.





CARDIOVASCULAR:


There is a regular rate and rhythm without any murmurs gallops or rubs.  





ABDOMEN:


Soft and nontender with normal bowel sounds. 





SKIN:


Skin is clear with no lesions or rashes and otherwise unremarkable.





NEUROLOGIC:


Patient is alert and oriented x3.  Cranial nerves II through XII are grossly 

intact.  Motor and sensory are also intact.  Normal speech, volume and content. 

Symmetrical smile. 





MUSCULOSKELETAL:


Normal extremities with adequate strength and full range of motion.  





LYMPHATICS:


No significant lymphadenopathy is noted





PSYCHIATRIC:


Normal psychiatric evaluation.  


Limitations: no limitations





Course


                                   Vital Signs











  11/13/23 11/13/23 11/13/23





  12:08 12:24 12:25


 


Temperature 97.7 F 98.3 F 


 


Pulse Rate 98 66 


 


Respiratory 22 16 16





Rate   


 


Blood Pressure 127/88  


 


O2 Sat by Pulse 98 98 





Oximetry   














Medical Decision Making





- Medical Decision Making





Was pt. sent in by a medical professional or institution (LINDA Ocasio, NP, urgent 

care, hospital, or nursing home...) When possible be specific


@  -No


Did you speak to anyone other than the patient for history (EMS, parent, family,

police, friend...)? What history was obtained from this source 


@  -No


Did you review nursing and triage notes (agree or disagree)?  Why? 


@  -I reviewed and agree with nursing and triage notes


Were old charts reviewed (outside hosp., previous admission, EMS record, old 

EKG, old radiological studies, urgent care reports/EKG's, nursing home records)?

Report findings 


@  -No old charts were reviewed


Differential Diagnosis (chest pain, altered mental status, abdominal pain women,

abdominal pain men, vaginal bleeding, weakness, fever, dyspnea, syncope, 

headache, dizziness, GI bleed, back pain, seizure, CVA, palpatations, mental 

health, musculoskeletal)? 


@  -Influence a, influenza be, RSV, COVID, viral syndrome this is not all 

inclusive list


EKG interpreted by me (3pts min.).


@  -As above


X-rays interpreted by me (1pt min.).


@  -Chest x-ray shows no acute abnormality


CT interpreted by me (1pt min.).


@  -None done


U/S interpreted by me (1pt. min.).


@  -None done


What testing was considered but not performed or refused? (CT, X-rays, U/S, 

labs)? Why?


@  -None


What meds were considered but not given or refused? Why?


@  -None


Did you discuss the management of the patient with other professionals 

(professionals i.e. , PA, NP, lab, RT, psych nurse, , , 

teacher, , )? Give summary


@  -No


Was smoking cessation discussed for >3mins.?


@  -No


Was critical care preformed (if so, how long)?


@  -No


Were there social determinants of health that impacted care today? How? 

(Homelessness, low income, unemployed, alcoholism, drug addiction, 

transportation, low edu. Level, literacy, decrease access to med. care, penitentiary, 

rehab)?


@  -No


Was there de-escalation of care discussed even if they declined (Discuss DNR or 

withdrawal of care, Hospice)? DNR status


@  -No


What co-morbidities impacted this encounter? (DM, HTN, Smoking, COPD, CAD, 

Cancer, CVA, ARF, Chemo, Hep., AIDS, mental health diagnosis, sleep apnea, 

morbid obesity)?


@  -None


Was patient admitted / discharged? Hospital course, mention meds given and 

route, prescriptions, significant lab abnormalities, going to OR and other 

pertinent info.


@  -Patient Is in no distress during his ED stay he was sleeping in [place] for 

most the time he was in the emergency department.  Patient's COVID influenza and

RSV tests were all negative.  Patient had a negative strep patient had an x-ray 

that showed no acute abnormality.


Undiagnosed new problem with uncertain prognosis?


@  -No


Drug Therapy requiring intensive monitoring for toxicity (Heparin, Nitro, 

Insulin, Cardizem)?


@  -No


Were any procedures done?


@  -No


Diagnosis/symptom?


@  -Viral Syndrome


Acute, or Chronic, or Acute on Chronic?


@  -Acute


Uncomplicated (without systemic symptoms) or Complicated (systemic symptoms)?


@  -Uncomplicated


Side effects of treatment?


@  -No


Exacerbation, Progression, or Severe Exacerbation?


@  -No


Poses a threat to life or bodily function? How? (Chest pain, USA, MI, pneumonia,

PE, COPD, DKA, ARF, appy, cholecystitis, CVA, Diverticulitis, Homicidal, 

Suicidal, threat to staff... and all critical care pts)


@  -No





- Lab Data


                                   Lab Results











  11/13/23 11/13/23 Range/Units





  12:55 12:55 


 


Influenza Type A (PCR)  Not Detected   (Not Detectd)  


 


Influenza Type B (PCR)  Not Detected   (Not Detectd)  


 


RSV (PCR)  Not Detected   (Not Detectd)  


 


SARS-CoV-2 (PCR)  Not Detected   (Not Detectd)  


 


Group A Strep (PCR)   NOT DETECTED  (Not Detectd)  














Disposition


Clinical Impression: 


 Viral infection





Disposition: HOME SELF-CARE


Instructions (If sedation given, give patient instructions):  Upper Respiratory 

Infection (ED)


Is patient prescribed a controlled substance at d/c from ED?: No


Referrals: 


None,Stated [Primary Care Provider] - 1-2 days


Time of Disposition: 14:18

## 2023-11-19 ENCOUNTER — HOSPITAL ENCOUNTER (EMERGENCY)
Dept: HOSPITAL 47 - EC | Age: 33
Discharge: HOME | End: 2023-11-19
Payer: COMMERCIAL

## 2023-11-19 VITALS — TEMPERATURE: 97.6 F | DIASTOLIC BLOOD PRESSURE: 71 MMHG | HEART RATE: 66 BPM | SYSTOLIC BLOOD PRESSURE: 112 MMHG

## 2023-11-19 VITALS — RESPIRATION RATE: 18 BRPM

## 2023-11-19 DIAGNOSIS — N43.3: ICD-10-CM

## 2023-11-19 DIAGNOSIS — F17.290: ICD-10-CM

## 2023-11-19 DIAGNOSIS — N34.2: Primary | ICD-10-CM

## 2023-11-19 DIAGNOSIS — A74.9: ICD-10-CM

## 2023-11-19 DIAGNOSIS — Z88.8: ICD-10-CM

## 2023-11-19 DIAGNOSIS — Z86.59: ICD-10-CM

## 2023-11-19 LAB
PH UR: 6 [PH] (ref 5–8)
RBC UR QL: 2 /HPF (ref 0–5)
SP GR UR: 1.01 (ref 1–1.03)
UROBILINOGEN UR QL STRIP: <2 MG/DL (ref ?–2)
WBC # UR AUTO: 16 /HPF (ref 0–5)

## 2023-11-19 PROCEDURE — 96372 THER/PROPH/DIAG INJ SC/IM: CPT

## 2023-11-19 PROCEDURE — 93975 VASCULAR STUDY: CPT

## 2023-11-19 PROCEDURE — 87086 URINE CULTURE/COLONY COUNT: CPT

## 2023-11-19 PROCEDURE — 76870 US EXAM SCROTUM: CPT

## 2023-11-19 PROCEDURE — 87491 CHLMYD TRACH DNA AMP PROBE: CPT

## 2023-11-19 PROCEDURE — 87591 N.GONORRHOEAE DNA AMP PROB: CPT

## 2023-11-19 PROCEDURE — 81001 URINALYSIS AUTO W/SCOPE: CPT

## 2023-11-19 PROCEDURE — 99284 EMERGENCY DEPT VISIT MOD MDM: CPT

## 2023-11-19 NOTE — US
EXAMINATION TYPE: US scrotum with doppler.  Grayscale and color Doppler Duplex imaging performed of edmund wiley scrotum.

 

DATE OF EXAM: 11/19/2023

 

COMPARISON: NONE

 

CLINICAL INDICATION: Male, 33 years old with history of pain, swelling; unusual discharge from penis 
and change in urine frequency per patient.  Right/middle lower scrotal pain. 

 

EXAM MEASUREMENTS:

 

TESTICLES:

Right Testicle:  3.7 x 4.1 x 2.9 cm

Left Testicle:  4.4 x 3.1 x 2.5 cm

 

EPIDIDYMIS HEAD:

Right Epididymis:  1.0 x 0.9 x 0.8 cm

Left Epididymis:  1.0 x 0.8 x 0.9 cm  

 

Doppler performed to assess for testicular vascularity; good bilateral color flow and waveforms are s
een.   There is no evidence of testicular torsion. 

Normal vascularity of the epididymides. Small left epididymal head cyst - 0.3 x 0.2 x 0.2 cm

 

Presence of hydroceles:  Bilateral, R>L [small to moderate right, small left]

Presence of varicoceles:  no

 

IMPRESSION: No evidence of testicular torsion or mass at the time of the examination.

1.  Small to moderate right and small left hydroceles.

2.  Small left epididymal head cyst. Normal epididymal vascularity.

## 2023-11-19 NOTE — ED
General Adult HPI





- General


Chief complaint: Urogenital


Stated complaint: having unusual discharge from [facility]


Time Seen by Provider: 11/19/23 18:07


Source: patient, RN notes reviewed


Mode of arrival: ambulatory


Limitations: no limitations





- History of Present Illness


Initial comments: 





33-year-old male presents to the emergency department with chief complaint of "I

think my girlfriend is cheating [id number] me."  He states that he has been having 

abnormal drainage from the penis for 7 days.  He states that yesterday he 

noticed some drainage and is underwear with a foul smell.  He states that the 

drainage appears clear.  He does note today some increased pain in his scrotum. 

He denies fever, chills, nausea, vomiting.





- Related Data


                                  Previous Rx's











 Medication  Instructions  Recorded


 


Doxycycline [Vibramycin] 100 mg PO BID #20 capsule 11/19/23











                                    Allergies











Allergy/AdvReac Type Severity Reaction Status Date / Time


 


clonazepam [From Klonopin] Allergy  Anaphylaxis Verified 11/19/23 18:04














Review of Systems


ROS Statement: 


Those systems with pertinent positive or pertinent negative responses have been 

documented in the HPI.





ROS Other: All systems not noted in ROS Statement are negative.





Past Medical History


Additional Past Medical History / Comment(s): heart murmur, angina


History of Any Multi-Drug Resistant Organisms: None Reported


Past Surgical History: No Surgical Hx Reported


Additional Past Surgical History / Comment(s): Nasal surgery


Past Psychological History: Bipolar, Depression


Smoking Status: Current every day smoker, Vaper


Past Alcohol Use History: Occasional


Past Drug Use History: None Reported





General Exam


Limitations: no limitations


General appearance: alert, in no apparent distress


Head exam: Present: atraumatic, normocephalic, normal inspection


Eye exam: Present: normal appearance, PERRL, EOMI.  Absent: scleral icterus, 

conjunctival injection, periorbital swelling


ENT exam: Present: normal exam, mucous membranes moist


Respiratory exam: Present: normal lung sounds bilaterally.  Absent: respiratory 

distress, wheezes, rales, rhonchi, stridor


Cardiovascular Exam: Present: regular rate, normal rhythm, normal heart sounds. 

Absent: systolic murmur, diastolic murmur, rubs, gallop, clicks


Neurological exam: Present: alert, oriented X3


Psychiatric exam: Present: normal affect, normal mood


Skin exam: Present: warm, dry, intact, normal color.  Absent: rash





Course


                                   Vital Signs











  11/19/23 11/19/23





  18:01 20:47


 


Temperature 97.0 F L 97.6 F


 


Pulse Rate 85 66


 


Respiratory 18 18





Rate  


 


Blood Pressure 101/70 112/71


 


O2 Sat by Pulse 100 98





Oximetry  














Medical Decision Making





- Medical Decision Making





Was pt. sent in by a medical professional or institution (LINDA Ocasio, NP, urgent 

care, hospital, or nursing home...) When possible be specific


@  -No


Did you speak to anyone other than the patient for history (EMS, parent, family,

police, friend...)? What history was obtained from this source 


@  -No


Did you review nursing and triage notes (agree or disagree)?  Why? 


@  -I reviewed and agree with nursing and triage notes


Were old charts reviewed (outside hosp., previous admission, EMS record, old 

EKG, old radiological studies, urgent care reports/EKG's, nursing home records)?

Report findings 


@  -No old charts were reviewed


Differential Diagnosis (chest pain, altered mental status, abdominal pain women,

abdominal pain men, vaginal bleeding, weakness, fever, dyspnea, syncope, 

headache, dizziness, GI bleed, back pain, seizure, CVA, palpatations, mental 

health, musculoskeletal)? 


@  -STI, urinary tract infection, appendicitis, testicular torsion, this list is

not all inclusive


EKG interpreted by me (3pts min.).


@  -none


X-rays interpreted by me (1pt min.).


@  -None done


CT interpreted by me (1pt min.).


@  -None done


U/S interpreted by me (1pt. min.).


@  -Scrotal ultrasound shows small to moderate right and small left hydroceles, 

small left epididymal head cyst; no evidence of testicular torsion or mass


What testing was considered but not performed or refused? (CT, X-rays, U/S, 

labs)? Why?


@  -None


What meds were considered but not given or refused? Why?


@  -None


Did you discuss the management of the patient with other professionals 

(professionals i.e. LINDA Ocasio, NP, lab, RT, psych nurse, , , 

teacher, , )? Give summary


@  -No


Was smoking cessation discussed for >3mins.?


@  -No


Was critical care preformed (if so, how long)?


@  -No


Were there social determinants of health that impacted care today? How? 

(Homelessness, low income, unemployed, alcoholism, drug addiction, 

transportation, low edu. Level, literacy, decrease access to med. care, shelter, 

rehab)?


@  -No


Was there de-escalation of care discussed even if they declined (Discuss DNR or 

withdrawal of care, Hospice)? DNR status


@  -No


What co-morbidities impacted this encounter? (DM, HTN, Smoking, COPD, CAD, 

Cancer, CVA, ARF, Chemo, Hep., AIDS, mental health diagnosis, sleep apnea, 

morbid obesity)?


@  -None


Was patient admitted / discharged? Hospital course, mention meds given and 

route, prescriptions, significant lab abnormalities, going to OR and other 

pertinent info.


@  -Discharged.  Patient presented to the emergency department with chief 

complaint of abdominal pain discharge with concern for sexually transmitted 

infection.  Ultrasound of the scrotum was obtained which shows no evidence of t

esticular torsion. UA shows negative nitrite, small leukocyte esterase, 16 WBC; 

gonorrhea and chlamydia testing sent.  Patient will be treated empirically for 

gonorrhea and chlamydia with Rocephin and doxycycline. Patient understanding and

agreeable with plan. Patient stable at time of discharge. Case discussed with 

Dr. Grijalva 


Undiagnosed new problem with uncertain prognosis?


@  -No


Drug Therapy requiring intensive monitoring for toxicity (Heparin, Nitro, 

Insulin, Cardizem)?


@  -No


Were any procedures done?


@  -No


Diagnosis/symptom?


@  -STI exposure


Acute, or Chronic, or Acute on Chronic?


@  -acute


Uncomplicated (without systemic symptoms) or Complicated (systemic symptoms)?


@  -uncomplicated


Side effects of treatment?


@  -No


Exacerbation, Progression, or Severe Exacerbation?


@  -No


Poses a threat to life or bodily function? How? (Chest pain, USA, MI, pneumonia,

PE, COPD, DKA, ARF, appy, cholecystitis, CVA, Diverticulitis, Homicidal, 

Suicidal, threat to staff... and all critical care pts)


@  -No





- Lab Data


                                   Lab Results











  11/19/23 11/19/23 Range/Units





  18:54 18:54 


 


Urine Color  Colorless   


 


Urine Appearance  Clear   (Clear)  


 


Urine pH  6.0   (5.0-8.0)  


 


Ur Specific Gravity  1.006   (1.001-1.035)  


 


Urine Protein  Negative   (Negative)  


 


Urine Glucose (UA)  Negative   (Negative)  


 


Urine Ketones  Negative   (Negative)  


 


Urine Blood  Negative   (Negative)  


 


Urine Nitrite  Negative   (Negative)  


 


Urine Bilirubin  Negative   (Negative)  


 


Urine Urobilinogen  <2.0   (<2.0)  mg/dL


 


Ur Leukocyte Esterase  Small H   (Negative)  


 


Urine RBC  2   (0-5)  /hpf


 


Urine WBC  16 H   (0-5)  /hpf


 


Chlamydia DNA (PCR)   Positive A  (Negative)  


 


N.gonorrhoeae DNA Probe   Negative  (Negative)  














Disposition


Clinical Impression: 


 Urethritis





Disposition: HOME SELF-CARE


Condition: Stable


Instructions (If sedation given, give patient instructions):  Nonspecific 

Urethritis in Men (ED)


Additional Instructions: 


Please  antibiotics and take to completion.  Return to the emergency 

department for new or worsening symptoms.


Prescriptions: 


Doxycycline [Vibramycin] 100 mg PO BID #20 capsule


Is patient prescribed a controlled substance at d/c from ED?: No


Referrals: 


None,Stated [Primary Care Provider] - 1-2 days

## 2023-12-27 ENCOUNTER — HOSPITAL ENCOUNTER (EMERGENCY)
Dept: HOSPITAL 47 - EC | Age: 33
Discharge: HOME | End: 2023-12-27
Payer: COMMERCIAL

## 2023-12-27 VITALS — TEMPERATURE: 99.7 F | SYSTOLIC BLOOD PRESSURE: 109 MMHG | DIASTOLIC BLOOD PRESSURE: 72 MMHG | HEART RATE: 67 BPM

## 2023-12-27 VITALS — RESPIRATION RATE: 18 BRPM

## 2023-12-27 DIAGNOSIS — Z20.822: ICD-10-CM

## 2023-12-27 DIAGNOSIS — J10.1: Primary | ICD-10-CM

## 2023-12-27 DIAGNOSIS — Z88.8: ICD-10-CM

## 2023-12-27 DIAGNOSIS — F17.290: ICD-10-CM

## 2023-12-27 PROCEDURE — 99283 EMERGENCY DEPT VISIT LOW MDM: CPT

## 2023-12-27 PROCEDURE — 71046 X-RAY EXAM CHEST 2 VIEWS: CPT

## 2023-12-27 PROCEDURE — 87636 SARSCOV2 & INF A&B AMP PRB: CPT

## 2023-12-27 NOTE — XR
EXAMINATION TYPE: XR chest 2V

 

DATE OF EXAM: 12/27/2023 1:46 PM

 

CLINICAL INDICATION:Male, 33 years old with history of cough; [facility]

 

COMPARISON: Chest radiographs from 11/13/2023.

 

TECHNIQUE: XR chest 2V Frontal and lateral views of the chest.

 

FINDINGS: 

Lungs/Pleura: There is no evidence of pleural effusion, focal consolidation, or pneumothorax.  

Pulmonary vascularity: Unremarkable.

Heart/mediastinum: Cardiomediastinal silhouette is unremarkable.

Musculoskeletal: No acute osseous pathology.

 

IMPRESSION: 

No acute cardiopulmonary disease/process.

## 2024-02-22 ENCOUNTER — HOSPITAL ENCOUNTER (EMERGENCY)
Dept: HOSPITAL 47 - EC | Age: 34
Discharge: HOME | End: 2024-02-22
Payer: COMMERCIAL

## 2024-02-22 VITALS
TEMPERATURE: 98.2 F | SYSTOLIC BLOOD PRESSURE: 132 MMHG | DIASTOLIC BLOOD PRESSURE: 78 MMHG | HEART RATE: 78 BPM | RESPIRATION RATE: 18 BRPM

## 2024-02-22 DIAGNOSIS — Z86.59: ICD-10-CM

## 2024-02-22 DIAGNOSIS — F17.290: ICD-10-CM

## 2024-02-22 DIAGNOSIS — Z88.8: ICD-10-CM

## 2024-02-22 DIAGNOSIS — Z76.0: ICD-10-CM

## 2024-02-22 DIAGNOSIS — B35.6: Primary | ICD-10-CM

## 2024-02-22 PROCEDURE — 99283 EMERGENCY DEPT VISIT LOW MDM: CPT

## 2024-02-22 NOTE — ED
General Adult HPI





- General


Chief complaint: Recheck/Abnormal Lab/Rx


Stated complaint: Std


Time Seen by Provider: 02/22/24 13:58


Source: patient


Mode of arrival: ambulatory


Limitations: no limitations





- History of Present Illness


Initial comments: 





She is an otherwise healthy 33-year-old gentleman who presents emergency room 

with complaints of irritation and rash to the penis.  Patient states he has had 

herpes in the past but states that this dry skin and rash feels and seems 

different than the recently.  He denies any new sexual partners.  [place] and is concerned that maybe

the bladder is too hard.  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

or other topicals.  He denies any penile diet patch or dysuria.  Denies any 

hematuria, testicular pain redness or swelling.  The skin on the under part of 

the shaft of the penis is red and irritated and pruritic in nature.  There is no

vesicular lesions per patient.  No severe pain.





- Related Data


                                  Previous Rx's











 Medication  Instructions  Recorded


 


Doxycycline [Vibramycin] 100 mg PO BID #20 capsule 11/19/23


 


Acyclovir [Zovirax] 800 mg PO TID 3 Days #30 tab 02/22/24


 


Clotrimazole [Clotrimazole 1% Top 1 applic TOPICAL BID 21 Days #60 ml 02/22/24





Soln]  











                                    Allergies











Allergy/AdvReac Type Severity Reaction Status Date / Time


 


clonazepam [From Klonopin] Allergy  Anaphylaxis Verified 02/22/24 13:37














Review of Systems


ROS Statement: 


Those systems with pertinent positive or pertinent negative responses have been 

documented in the HPI.





ROS Other: All systems not noted in ROS Statement are negative.





Past Medical History


Additional Past Medical History / Comment(s): heart murmur, angina


History of Any Multi-Drug Resistant Organisms: None Reported


Past Surgical History: No Surgical Hx Reported


Additional Past Surgical History / Comment(s): Nasal surgery


Past Psychological History: Bipolar, Depression


Smoking Status: Current every day smoker, Vaper


Past Alcohol Use History: Occasional


Past Drug Use History: None Reported





General Exam


Limitations: no limitations


General appearance: alert, in no apparent distress


Cardiovascular Exam: Present: regular rate


 exam: Present: other (Erythematous moist rash over the base of the penile 

shaft with irritation but no lesions no vesicular lesions no obvious balanitis. 

No testicular pain redness or swelling.  No penile discharge.)


Extremities exam: Present: full ROM


Neurological exam: Present: alert, oriented X3


Psychiatric exam: Present: normal affect, normal mood


Skin exam: Present: warm, dry





Course


                                   Vital Signs











  02/22/24 02/22/24





  13:35 14:57


 


Temperature 98.6 F 98.2 F


 


Pulse Rate 76 78


 


Respiratory 16 18





Rate  


 


Blood Pressure 131/78 132/78


 


O2 Sat by Pulse 98 98





Oximetry  














- Reevaluation(s)


Reevaluation #1: 





02/22/24 1440





I discussed management with the tinea cruris with the patient.  I discussed with

him that this does not appear to be a herpes outbreak does not require 

antibiotics 500.  I will write him for refill if he does start to notice the 

vesicular lesions he has had in the past.





Discussed management with topical ointments as well.








Medical Decision Making





- Medical Decision Making





Was pt. sent in by a medical professional or institution (, PA, NP, urgent 

care, hospital, or nursing home...) When possible be specific


@  -[No]


Did you speak to anyone other than the patient for history (EMS, parent, family,

 police, friend...)? What history was obtained from this source 


@  -[No]


Did you review nursing and triage notes (agree or disagree)?  Why? 


@  -[I reviewed and agree with nursing and triage notes]


Were old charts reviewed (outside hosp., previous admission, EMS record, old 

EKG, old radiological studies, urgent care reports/EKG's, nursing home records)?

Report findings 


@  -[No old charts were reviewed]


Differential Diagnosis (chest pain, altered mental status, abdominal pain women,

abdominal pain men, vaginal bleeding, weakness, fever, dyspnea, syncope, 

headache, dizziness, GI bleed, back pain, seizure, CVA, palpatations, mental 

health, musculoskeletal)? 


@  -[herpes gentalis, balantis, tinea cruris, STI


EKG interpreted by me (3pts min.).


@  -[As above]


X-rays interpreted by me (1pt min.).


@  -[None done]


CT interpreted by me (1pt min.).


@  -[None done]


U/S interpreted by me (1pt. min.).


@  -[None done]


What testing was considered but not performed or refused? (CT, X-rays, U/S, 

labs)? Why?


@  -[None]


What meds were considered but not given or refused? Why?


@  -[None]


Did you discuss the management of the patient with other professionals 

(professionals i.e. , PA, NP, lab, RT, psych nurse, , , 

teacher, , )? Give summary


@  -[No]


Was smoking cessation discussed for >3mins.?


@  -[No]


Was critical care preformed (if so, how long)?


@  -[No]


Were there social determinants of health that impacted care today? How? (H

omelessness, low income, unemployed, alcoholism, drug addiction, transportation,

 low edu. Level, literacy, decrease access to med. care, snf, rehab)?


@  -[No]


Was there de-escalation of care discussed even if they declined (Discuss DNR or 

withdrawal of care, Hospice)? DNR status


@  -[No]


What co-morbidities impacted this encounter? (DM, HTN, Smoking, COPD, CAD, 

Cancer, CVA, ARF, Chemo, Hep., AIDS, mental health diagnosis, sleep apnea, 

morbid obesity)?


@  -[None]


Was patient admitted / discharged? Hospital course, mention meds given and 

route, prescriptions, significant lab abnormalities, going to OR and other 

pertinent info.


@  -[patient is stable to follow up as an outpatient. this is a tinea cruris 

that can be managed with topical antifungal ointment. 


Undiagnosed new problem with uncertain prognosis?


@  -[No]


Drug Therapy requiring intensive monitoring for toxicity (Heparin, Nitro, 

Insulin, Cardizem)?


@  -[No]


Were any procedures done?


@  -[No]


Diagnosis/symptom?


@  -[Tinea Cruris   


Acute, or Chronic, or Acute on Chronic?


@  acute


Uncomplicated (without systemic symptoms) or Complicated (systemic symptoms)?


@  -[default]


Side effects of treatment?


@  -[No]


Exacerbation, Progression, or Severe Exacerbation?


@  -[No]


Poses a threat to life or bodily function? How? (Chest pain, USA, MI, pneumonia,

 PE, COPD, DKA, ARF, appy, cholecystitis, CVA, Diverticulitis, Homicidal, 

Suicidal, threat to staff... and all critical care pts)


@  -[No]





Disposition


Clinical Impression: 


 Tinea cruris, Encounter for medication refill





Disposition: HOME SELF-CARE


Condition: Good


Instructions (If sedation given, give patient instructions):  Fabricio Dunaway (ED)


Prescriptions: 


Clotrimazole [Clotrimazole 1% Top Soln] 1 applic TOPICAL BID 21 Days #60 ml


Acyclovir [Zovirax] 800 mg PO TID 3 Days #30 tab


Is patient prescribed a controlled substance at d/c from ED?: No


If prescribed controlled substance>3 days was MAPS reviewed?: No


Referrals: 


None,Stated [Primary Care Provider] - 1-2 days


Time of Disposition: 14:48

## 2024-04-15 ENCOUNTER — HOSPITAL ENCOUNTER (EMERGENCY)
Dept: HOSPITAL 47 - EC | Age: 34
Discharge: HOME | End: 2024-04-15
Payer: COMMERCIAL

## 2024-04-15 VITALS — SYSTOLIC BLOOD PRESSURE: 90 MMHG | DIASTOLIC BLOOD PRESSURE: 59 MMHG | HEART RATE: 53 BPM

## 2024-04-15 VITALS — TEMPERATURE: 98.7 F

## 2024-04-15 VITALS — RESPIRATION RATE: 18 BRPM

## 2024-04-15 DIAGNOSIS — F17.290: ICD-10-CM

## 2024-04-15 DIAGNOSIS — R42: Primary | ICD-10-CM

## 2024-04-15 DIAGNOSIS — R00.1: ICD-10-CM

## 2024-04-15 DIAGNOSIS — Z88.8: ICD-10-CM

## 2024-04-15 LAB
ANION GAP SERPL CALC-SCNC: 7 MMOL/L
BASOPHILS # BLD AUTO: 0 K/UL (ref 0–0.2)
BASOPHILS NFR BLD AUTO: 1 %
BUN SERPL-SCNC: 13 MG/DL (ref 9–20)
CALCIUM SPEC-MCNC: 8.1 MG/DL (ref 8.4–10.2)
CHLORIDE SERPL-SCNC: 105 MMOL/L (ref 98–107)
CO2 SERPL-SCNC: 27 MMOL/L (ref 22–30)
EOSINOPHIL # BLD AUTO: 0.1 K/UL (ref 0–0.7)
EOSINOPHIL NFR BLD AUTO: 1 %
ERYTHROCYTE [DISTWIDTH] IN BLOOD BY AUTOMATED COUNT: 4.53 M/UL (ref 4.3–5.9)
ERYTHROCYTE [DISTWIDTH] IN BLOOD: 12.7 % (ref 11.5–15.5)
GLUCOSE SERPL-MCNC: 77 MG/DL (ref 74–99)
HCT VFR BLD AUTO: 38.8 % (ref 39–53)
HGB BLD-MCNC: 13.5 GM/DL (ref 13–17.5)
LYMPHOCYTES # SPEC AUTO: 2 K/UL (ref 1–4.8)
LYMPHOCYTES NFR SPEC AUTO: 30 %
MCH RBC QN AUTO: 29.8 PG (ref 25–35)
MCHC RBC AUTO-ENTMCNC: 34.7 G/DL (ref 31–37)
MCV RBC AUTO: 85.7 FL (ref 80–100)
MONOCYTES # BLD AUTO: 0.6 K/UL (ref 0–1)
MONOCYTES NFR BLD AUTO: 9 %
NEUTROPHILS # BLD AUTO: 3.9 K/UL (ref 1.3–7.7)
NEUTROPHILS NFR BLD AUTO: 59 %
PLATELET # BLD AUTO: 122 K/UL (ref 150–450)
POTASSIUM SERPL-SCNC: 3.7 MMOL/L (ref 3.5–5.1)
SODIUM SERPL-SCNC: 139 MMOL/L (ref 137–145)
WBC # BLD AUTO: 6.6 K/UL (ref 3.8–10.6)

## 2024-04-15 PROCEDURE — 96374 THER/PROPH/DIAG INJ IV PUSH: CPT

## 2024-04-15 PROCEDURE — 99285 EMERGENCY DEPT VISIT HI MDM: CPT

## 2024-04-15 PROCEDURE — 96361 HYDRATE IV INFUSION ADD-ON: CPT

## 2024-04-15 PROCEDURE — 84484 ASSAY OF TROPONIN QUANT: CPT

## 2024-04-15 PROCEDURE — 85025 COMPLETE CBC W/AUTO DIFF WBC: CPT

## 2024-04-15 PROCEDURE — 80048 BASIC METABOLIC PNL TOTAL CA: CPT

## 2024-04-15 PROCEDURE — 87636 SARSCOV2 & INF A&B AMP PRB: CPT

## 2024-04-15 PROCEDURE — 36415 COLL VENOUS BLD VENIPUNCTURE: CPT

## 2024-04-15 RX ADMIN — METOCLOPRAMIDE STA MG: 5 INJECTION, SOLUTION INTRAMUSCULAR; INTRAVENOUS at 04:54

## 2024-04-15 RX ADMIN — CEFAZOLIN STA MLS/HR: 330 INJECTION, POWDER, FOR SOLUTION INTRAMUSCULAR; INTRAVENOUS at 04:53

## 2024-04-15 NOTE — ED
General Adult HPI





- General


Chief complaint: Chest Pain


Stated complaint: SOB chest pain


Source: patient


Mode of arrival: ambulatory


Limitations: no limitations





- History of Present Illness


Initial comments: 


Dictation was produced using dragon dictation software. please excuse any g

rammatical, word or spelling errors. 











Chief Complaint: 33-year-old male presents to the emergency department for chest

pain, dyspnea dizziness





History of Present Illness: Patient 33-year-old male who states that he has no 

significant comorbidities.  He does not vape or use electronic cigarettes.  

Denies any other drug use.  Nondrinker.  States that he was driving home from 

work all of a sudden he felt dizzy.  Patient had similar episode in the past.  

Patient does feel like the room is spinning.  Did have some nausea.  Patient 

also has been exposed to his family member who has been ill with viral 

infection.








The ROS documented in this emergency department record has been reviewed and con

firmed by me.  Those systems with pertinent positive or negative responses have 

been documented in the HPI.  All other systems are other negative and/or 

noncontributory.




















- Related Data


                                  Previous Rx's











 Medication  Instructions  Recorded


 


Doxycycline [Vibramycin] 100 mg PO BID #20 capsule 11/19/23


 


Acyclovir [Zovirax] 800 mg PO TID 3 Days #30 tab 02/22/24


 


Clotrimazole [Clotrimazole 1% Top 1 applic TOPICAL BID 21 Days #60 ml 02/22/24





Soln]  


 


Meclizine [Antivert] 25 mg PO TID PRN #15 tab 04/15/24











                                    Allergies











Allergy/AdvReac Type Severity Reaction Status Date / Time


 


clonazepam [From Klonopin] Allergy  Anaphylaxis Verified 04/15/24 03:50














Review of Systems


ROS Statement: 


Those systems with pertinent positive or pertinent negative responses have been 

documented in the HPI.





ROS Other: All systems not noted in ROS Statement are negative.





Past Medical History


Past Medical History: Chest Pain / Angina


Additional Past Medical History / Comment(s): heart murmur, angina


History of Any Multi-Drug Resistant Organisms: None Reported


Past Surgical History: No Surgical Hx Reported


Additional Past Surgical History / Comment(s): Nasal surgery


Past Psychological History: Bipolar, Depression


Smoking Status: Current every day smoker, Vaper


Past Alcohol Use History: Occasional


Past Drug Use History: None Reported





General Exam





- General Exam Comments


Initial Comments: 











PHYSICAL EXAM:


General Impression: Alert and oriented x3, not in acute distress


HEENT: Normocephalic atraumatic, extra-ocular movements intact, pupils equal and

reactive to light bilaterally, mucous membranes moist.


Cardiovascular: Heart regular rate and rhythm


Chest: Able to complete full sentences, no retractions, no tachypnea


Abdomen: abdomen soft, non-tender, non-distended, no organomegaly


Musculoskeletal: Pulses present and equal in all extremities, no peripheral 

edema


Motor:  no focal deficits noted


Neurological: CN II-XII grossly intact, no focal motor or sensory deficits noted


Skin: Intact with no visualized rashes


Psych: Normal affect and mood











Limitations: no limitations





Course


                                   Vital Signs











  04/15/24 04/15/24 04/15/24





  03:50 05:00 06:02


 


Temperature 98.7 F  


 


Pulse Rate 60 64 68


 


Respiratory 17 18 18





Rate   


 


Blood Pressure 103/68 98/66 90/63


 


O2 Sat by Pulse 100 53 L 





Oximetry   














EKG Findings





- EKG Comments:


EKG Findings:: My EKG interpretation: Ventricular rate 59, sinus bradycardia,.  

153, cures 97, QTc 417. No MD prolongation, no QTC prolongation, no ST or T-wave

changes noted.  Overall, this EKG is unremarkable





Medical Decision Making





- Medical Decision Making


Was pt. sent in by a medical professional or institution (, PA, NP, urgent 

care, hospital, or nursing home...) When possible be specific


@  -No


Did you speak to anyone other than the patient for history (EMS, parent, family,

police, friend...)? What history was obtained from this source 


@  -No


Did you review nursing and triage notes (agree or disagree)?  Why? 


@  -I reviewed and agree with nursing and triage notes


Were old charts reviewed (outside hosp., previous admission, EMS record, old 

EKG, old radiological studies, urgent care reports/EKG's, nursing home records)?

Report findings 


@  -No old charts were reviewed


Differential Diagnosis (chest pain, altered mental status, abdominal pain women,

abdominal pain men, vaginal bleeding, musculoskeletal, weakness, fever, dyspnea,

syncope, headache, dizziness, GI bleed, back pain, seizure, CVA, palpatations, 

mental health)? 


@  -Differential Dizziness:


Benign paroxysmal positional Vertigo, Menieres disease, otitis media, acoustic 

neuroma, vertebrobasilar insufficiency, cerebellar stroke, encephalitis, 

hypovolemic, arrhythmia, coronary artery syndrome, anemia, this is not meant to 

be an all-inclusive list





EKG interpreted by me (3pts min.).


@  -See above


X-rays interpreted by me (1pt min.).


@  -None done


CT interpreted by me (1pt min.).


@  -None done


U/S interpreted by me (1pt. min.).


@  -None done


What testing was considered but not performed or refused? (CT, X-rays, U/S, 

labs)? Why?


@  -None


What meds were considered but not given or refused? Why?


@  -None


Did you discuss the management of the patient with other professionals 

(professionals i.e. , PA, NP, lab, RT, psych nurse, , , 

teacher, , )? Give summary


@  -No


Was smoking cessation discussed for >3mins.?


@  -No


Was critical care preformed (if so, how long)?


@  -No


Were there social determinants of health that impacted care today? How? 

(Homelessness, low income, unemployed, alcoholism, drug addiction, 

transportation, low edu. Level, literacy, decrease access to med. care, [facility], 

rehab)?


@  -No


Was there de-escalation of care discussed even if they declined (Discuss DNR or 

withdrawal of care, Hospice)? DNR status


@  -No


What co-morbidities impacted this encounter? (DM, HTN, Smoking, COPD, CAD, 

Cancer, CVA, ARF, Chemo, Hep., AIDS, mental health diagnosis, sleep apnea, 

morbid obesity)?


@  -None


Was patient admitted / discharged? Hospital course, mention meds given and route

, prescriptions, significant lab abnormalities, going to OR and other pertinent 

info.


@  -33-year-old male presents emergency department with vague complaints of 

dizziness, chest pain shortness of breath.  Vital signs upon arrival are within 

acceptable limits.  Laboratory evaluation is unremarkable.  Troponin is 

negative.  Viral testing is negative.  Patient observed in the emergency 

department for several hours.  Reevaluated bedside at 6:55 AM after being given 

Reglan and IV fluids states that his symptoms are improved.  States that he does

feel like his symptoms are triggered with head movements.  Patient prescription 

for Antivert and discharge.


Undiagnosed new problem with uncertain prognosis?


@  -No


Drug Therapy requiring intensive monitoring for toxicity (Heparin, Nitro, 

Insulin, Cardizem)?


@  -No


Were any procedures done?


@  -No


Diagnosis/symptom?  Acute, or Chronic, or Acute on Chronic?  Uncomplicated 

(without systemic symptoms) or Complicated (systemic symptoms)?


@  -Dizziness


Side effects of treatment?


@  -No


Exacerbation, Progression, or Severe Exacerbation?


@  -No


Poses a threat to life or bodily function? How? (Chest pain, USA, MI, pneumonia,

PE, COPD, DKA, ARF, appy, cholecystitis, CVA, Diverticulitis, Homicidal, 

Suicidal, threat to staff... and all critical care pts)


@  -No











- Lab Data


Result diagrams: 


                                 04/15/24 06:01





                                 04/15/24 06:01


                                   Lab Results











  04/15/24 04/15/24 04/15/24 Range/Units





  05:00 06:01 06:01 


 


WBC   6.6   (3.8-10.6)  k/uL


 


RBC   4.53   (4.30-5.90)  m/uL


 


Hgb   13.5   (13.0-17.5)  gm/dL


 


Hct   38.8 L   (39.0-53.0)  %


 


MCV   85.7   (80.0-100.0)  fL


 


MCH   29.8   (25.0-35.0)  pg


 


MCHC   34.7   (31.0-37.0)  g/dL


 


RDW   12.7   (11.5-15.5)  %


 


Plt Count   122 L   (150-450)  k/uL


 


MPV   10.5   


 


Neutrophils %   59   %


 


Lymphocytes %   30   %


 


Monocytes %   9   %


 


Eosinophils %   1   %


 


Basophils %   1   %


 


Neutrophils #   3.9   (1.3-7.7)  k/uL


 


Lymphocytes #   2.0   (1.0-4.8)  k/uL


 


Monocytes #   0.6   (0-1.0)  k/uL


 


Eosinophils #   0.1   (0-0.7)  k/uL


 


Basophils #   0.0   (0-0.2)  k/uL


 


Sodium    139  (137-145)  mmol/L


 


Potassium    3.7  (3.5-5.1)  mmol/L


 


Chloride    105  ()  mmol/L


 


Carbon Dioxide    27  (22-30)  mmol/L


 


Anion Gap    7  mmol/L


 


BUN    13  (9-20)  mg/dL


 


Creatinine    0.73  (0.66-1.25)  mg/dL


 


Est GFR (CKD-EPI)AfAm    >90  (>60 ml/min/1.73 sqM)  


 


Est GFR (CKD-EPI)NonAf    >90  (>60 ml/min/1.73 sqM)  


 


Glucose    77  (74-99)  mg/dL


 


Calcium    8.1 L  (8.4-10.2)  mg/dL


 


Troponin I     (0.000-0.034)  ng/mL


 


Influenza Type A (PCR)  Not Detected    (Not Detectd)  


 


Influenza Type B (PCR)  Not Detected    (Not Detectd)  


 


RSV (PCR)  Not Detected    (Not Detectd)  


 


SARS-CoV-2 (PCR)  Not Detected    (Not Detectd)  














  04/15/24 Range/Units





  06:01 


 


WBC   (3.8-10.6)  k/uL


 


RBC   (4.30-5.90)  m/uL


 


Hgb   (13.0-17.5)  gm/dL


 


Hct   (39.0-53.0)  %


 


MCV   (80.0-100.0)  fL


 


MCH   (25.0-35.0)  pg


 


MCHC   (31.0-37.0)  g/dL


 


RDW   (11.5-15.5)  %


 


Plt Count   (150-450)  k/uL


 


MPV   


 


Neutrophils %   %


 


Lymphocytes %   %


 


Monocytes %   %


 


Eosinophils %   %


 


Basophils %   %


 


Neutrophils #   (1.3-7.7)  k/uL


 


Lymphocytes #   (1.0-4.8)  k/uL


 


Monocytes #   (0-1.0)  k/uL


 


Eosinophils #   (0-0.7)  k/uL


 


Basophils #   (0-0.2)  k/uL


 


Sodium   (137-145)  mmol/L


 


Potassium   (3.5-5.1)  mmol/L


 


Chloride   ()  mmol/L


 


Carbon Dioxide   (22-30)  mmol/L


 


Anion Gap   mmol/L


 


BUN   (9-20)  mg/dL


 


Creatinine   (0.66-1.25)  mg/dL


 


Est GFR (CKD-EPI)AfAm   (>60 ml/min/1.73 sqM)  


 


Est GFR (CKD-EPI)NonAf   (>60 ml/min/1.73 sqM)  


 


Glucose   (74-99)  mg/dL


 


Calcium   (8.4-10.2)  mg/dL


 


Troponin I  <0.012  (0.000-0.034)  ng/mL


 


Influenza Type A (PCR)   (Not Detectd)  


 


Influenza Type B (PCR)   (Not Detectd)  


 


RSV (PCR)   (Not Detectd)  


 


SARS-CoV-2 (PCR)   (Not Detectd)  














Disposition


Clinical Impression: 


 Vertigo





Disposition: HOME SELF-CARE


Condition: Good


Instructions (If sedation given, give patient instructions):  Vertigo (ED)


Prescriptions: 


Meclizine [Antivert] 25 mg PO TID PRN #15 tab


 PRN Reason: dizziness


Is patient prescribed a controlled substance at d/c from ED?: No


Referrals: 


None,Stated [Primary Care Provider] - 1-2 days


Time of Disposition: 06:54

## 2025-01-14 ENCOUNTER — HOSPITAL ENCOUNTER (EMERGENCY)
Dept: HOSPITAL 47 - EC | Age: 35
Discharge: HOME | End: 2025-01-14
Payer: COMMERCIAL

## 2025-01-14 VITALS
RESPIRATION RATE: 17 BRPM | SYSTOLIC BLOOD PRESSURE: 118 MMHG | DIASTOLIC BLOOD PRESSURE: 82 MMHG | HEART RATE: 81 BPM | TEMPERATURE: 98 F

## 2025-01-14 DIAGNOSIS — Z02.83: Primary | ICD-10-CM

## 2025-01-14 PROCEDURE — 99499 UNLISTED E&M SERVICE: CPT
